# Patient Record
Sex: MALE | Race: WHITE | NOT HISPANIC OR LATINO | Employment: OTHER | URBAN - METROPOLITAN AREA
[De-identification: names, ages, dates, MRNs, and addresses within clinical notes are randomized per-mention and may not be internally consistent; named-entity substitution may affect disease eponyms.]

---

## 2017-02-22 ENCOUNTER — OFFICE VISIT (OUTPATIENT)
Dept: SURGERY | Facility: CLINIC | Age: 42
End: 2017-02-22
Payer: MEDICAID

## 2017-02-22 VITALS
OXYGEN SATURATION: 97 % | RESPIRATION RATE: 17 BRPM | SYSTOLIC BLOOD PRESSURE: 138 MMHG | HEART RATE: 89 BPM | DIASTOLIC BLOOD PRESSURE: 67 MMHG

## 2017-02-22 DIAGNOSIS — K62.89 ANAL OR RECTAL PAIN: Primary | ICD-10-CM

## 2017-02-22 DIAGNOSIS — K62.5 RECTAL BLEEDING: ICD-10-CM

## 2017-02-22 PROCEDURE — 99203 OFFICE O/P NEW LOW 30 MIN: CPT | Mod: S$GLB,,, | Performed by: SURGERY

## 2017-02-22 NOTE — PROGRESS NOTES
"History & Physical    SUBJECTIVE:     History of Present Illness:  Patient is a 41 y.o. male presents with anal bleeding and bright red blood per rectum.  Patient was seen in the emergency room approximately 2 weeks ago for similar complaints and was scheduled follow-up for here today.  Patient reports a several year history of intermittent episodes of bright red blood per rectum and perianal discomfort.  He denies any issues with constipation.  He reports regular soft bowel movements daily.  He reports a history of previous "cutting my hemorrhoids" several years ago.  He is unsure of exactly what was done.  He denies any history of previous endoscopy.  No significant weight changes.  No significant family history of colorectal cancer.    Chief Complaint   Patient presents with    Rectal Bleeding     Hurting Bad.        Review of patient's allergies indicates:  No Known Allergies    Current Outpatient Prescriptions   Medication Sig Dispense Refill    hydrocortisone-pramoxine (PROCTOFOAM-HS) rectal foam Place 1 applicator rectally 2 (two) times daily. 10 g 0    diclofenac (VOLTAREN) 75 MG EC tablet Take 1 tablet (75 mg total) by mouth 2 (two) times daily. 60 tablet 0    hydrocodone-acetaminophen 10-325mg (NORCO)  mg Tab Take 1 tablet by mouth every 4 (four) hours as needed for Pain. 12 tablet 0     No current facility-administered medications for this visit.        Past Medical History   Diagnosis Date    Depression     Status post hip surgery      right    Testicle lump      surgery     Past Surgical History   Procedure Laterality Date    Hip surgery      Right testicle surgery       Family History   Problem Relation Age of Onset    Alcohol abuse Father      Social History   Substance Use Topics    Smoking status: Current Every Day Smoker     Packs/day: 1.00     Years: 30.00     Types: Cigarettes    Smokeless tobacco: Never Used    Alcohol use 0.0 oz/week     0 Glasses of wine, 0 Shots of liquor " per week      Comment: daily drinker        Review of Systems:  Review of Systems   Constitutional: Negative for appetite change, chills, fatigue and fever.   Respiratory: Negative for cough, chest tightness, shortness of breath and wheezing.    Cardiovascular: Negative for chest pain, palpitations and leg swelling.   Gastrointestinal: Positive for anal bleeding, blood in stool and rectal pain. Negative for abdominal distention, constipation, diarrhea, nausea and vomiting.       OBJECTIVE:     Vital Signs (Most Recent)  Pulse: 89 (02/22/17 1355)  Resp: 17 (02/22/17 1355)  BP: 138/67 (02/22/17 1355)  SpO2: 97 % (02/22/17 1355)           Physical Exam:    General: Alert and oriented, No acute distress.   Eye: Extraocular movements are intact, Normal conjunctiva.  No scleral icterus  HENT: Normocephalic, Normal hearing, Oral mucosa is moist, No pharyngeal erythema.   Neck: Supple, Non-tender, No jugular venous distention.  No mass or LAD  Respiratory: Respirations are non-labored, Symmetrical chest wall expansion, No chest wall tenderness.   Cardiovascular: Normal rate, Regular rhythm, Extremities warm, No edema.   Gastrointestinal: Soft, Non-tender, Non-distended, No organomegaly.   Rectal: External exam shows no external skin tags, fissure or evidence of fistula in ano.  Digital rectal exam shows normal tone without mass or gross blood.   Integumentary: Warm, Dry.   Neurologic: Alert, Oriented, Normal motor function, No focal defects.       ASSESSMENT/PLAN:     41-year-old male with bright red blood per rectum and anal pain.  Physical examination overall unremarkable.  Patient may be having some issues with hemorrhoids however some concern patient is seeking.  Have made medical recommendations to increase fiber intake and a particular attention to adequate hydration.  Furthermore, given patient's history we will obtain laboratory data including CBC and CMP.  Will refer the patient to gastroenterology to evaluate for  endoscopy.  Patient requesting narcotics for his pain which I have declined to prescribe given his known history of substance abuse and current issues with excessive alcohol consumption.  I have strongly recommended lifestyle changes to the patient.  We'll have the patient follow-up with me after GI evaluation.

## 2017-03-16 ENCOUNTER — INITIAL CONSULT (OUTPATIENT)
Dept: SURGERY | Facility: CLINIC | Age: 42
End: 2017-03-16
Payer: MEDICAID

## 2017-03-16 VITALS
OXYGEN SATURATION: 99 % | RESPIRATION RATE: 19 BRPM | BODY MASS INDEX: 20.81 KG/M2 | DIASTOLIC BLOOD PRESSURE: 68 MMHG | SYSTOLIC BLOOD PRESSURE: 130 MMHG | HEART RATE: 98 BPM | HEIGHT: 71 IN | WEIGHT: 148.63 LBS

## 2017-03-16 DIAGNOSIS — K64.8 INTERNAL HEMORRHOIDS WITH COMPLICATION: ICD-10-CM

## 2017-03-16 DIAGNOSIS — K64.9 BLEEDING HEMORRHOID: ICD-10-CM

## 2017-03-16 DIAGNOSIS — K64.2 THIRD DEGREE HEMORRHOIDS: Primary | ICD-10-CM

## 2017-03-16 PROCEDURE — 99214 OFFICE O/P EST MOD 30 MIN: CPT | Mod: 57,S$GLB,, | Performed by: EMERGENCY MEDICINE

## 2017-03-16 RX ORDER — HYDROCODONE BITARTRATE AND ACETAMINOPHEN 10; 325 MG/1; MG/1
1 TABLET ORAL 4 TIMES DAILY PRN
Qty: 32 TABLET | Refills: 0 | Status: SHIPPED | OUTPATIENT
Start: 2017-03-16 | End: 2017-03-24

## 2017-03-16 RX ORDER — DOCUSATE SODIUM 100 MG/1
100 CAPSULE, LIQUID FILLED ORAL 2 TIMES DAILY
Qty: 40 CAPSULE | Refills: 0 | COMMUNITY
Start: 2017-03-16 | End: 2017-07-03

## 2017-03-16 NOTE — LETTER
March 16, 2017      Jm Patterson MD  [address]  [facility] [address]           [address]  [address]  [facility] [address]  Phone: [phone]  Fax: [phone]          Patient: Henok Willett   MR Number: [id number]   YOB: 1975   Date of Visit: 3/16/2017       Dear Dr. Jm Patterson:    Thank you for referring Henok Willett to me for evaluation. Attached you will find relevant portions of my assessment and plan of care.    If you have questions, please do not hesitate to call me. I look forward to following Henok Willett along with you.    Sincerely,    LUH Saha MD    Enclosure  CC:  No Recipients    If you would like to receive this communication electronically, please contact [email or web address] or [phone] to request more information on [employer] access.    For providers and/or their staff who would like to refer a patient to [facility], please contact us through our one-stop-shop provider referral line, [facility] [place], at [phone].    If you feel you have received this communication in error or would no longer like to receive these types of communications, please e-mail [email or web address]

## 2017-03-16 NOTE — PROGRESS NOTES
History & Physical    SUBJECTIVE:     History of Present Illness:  Patient is a 41 y.o. male presents with severe rectal pain and bleeding from hemorrhoids for the past few days. It is becoming worse and he had a significant bleeding episode today  Chief Complaint   Patient presents with    Hemorrhoids       Review of patient's allergies indicates:  No Known Allergies    Current Outpatient Prescriptions   Medication Sig Dispense Refill    diclofenac (VOLTAREN) 75 MG EC tablet Take 1 tablet (75 mg total) by mouth 2 (two) times daily. 60 tablet 0    hydrocortisone-pramoxine (PROCTOFOAM-HS) rectal foam Place 1 applicator rectally 2 (two) times daily. 10 applicator 0     No current facility-administered medications for this visit.        Past Medical History:   Diagnosis Date    Depression     Status post hip surgery     right    Testicle lump     surgery     Past Surgical History:   Procedure Laterality Date    HIP SURGERY      Right Testicle Surgery       Family History   Problem Relation Age of Onset    Alcohol abuse Father      Social History   Substance Use Topics    Smoking status: Current Every Day Smoker     Packs/day: 1.00     Years: 30.00     Types: Cigarettes    Smokeless tobacco: Never Used    Alcohol use 0.0 oz/week     0 Glasses of wine, 0 Shots of liquor per week      Comment: daily drinker        Review of Systems:  Review of Systems   Constitutional: Negative for appetite change, chills, diaphoresis, fatigue, fever and unexpected weight change.   HENT: Negative for congestion, dental problem, ear pain, facial swelling, mouth sores, nosebleeds, rhinorrhea, sinus pressure, sore throat, trouble swallowing and voice change.    Eyes: Negative for photophobia, redness and visual disturbance.   Respiratory: Negative for cough, choking, chest tightness, shortness of breath and wheezing.    Cardiovascular: Negative for chest pain, palpitations and leg swelling.   Gastrointestinal: Negative for  "abdominal distention, abdominal pain, anal bleeding, blood in stool, constipation, diarrhea, nausea, rectal pain and vomiting.   Endocrine: Negative for cold intolerance, heat intolerance, polydipsia, polyphagia and polyuria.   Genitourinary: Negative for difficulty urinating, discharge, dysuria, flank pain, hematuria, scrotal swelling and testicular pain.        Rectal pain and bleeding   Musculoskeletal: Negative for back pain, gait problem, joint swelling, neck pain and neck stiffness.   Skin: Negative for rash and wound.   Allergic/Immunologic: Negative for environmental allergies, food allergies and immunocompromised state.   Neurological: Negative for dizziness, seizures, syncope, facial asymmetry, speech difficulty, weakness, light-headedness, numbness and headaches.   Hematological: Negative for adenopathy. Does not bruise/bleed easily.   Psychiatric/Behavioral: Negative for confusion. The patient is not nervous/anxious.        OBJECTIVE:     Vital Signs (Most Recent)  Pulse: 98 (03/16/17 1403)  Resp: 19 (03/16/17 1403)  BP: 130/68 (03/16/17 1403)  SpO2: 99 % (03/16/17 1403)  5' 11" (1.803 m)  67.4 kg (148 lb 9.6 oz)     Physical Exam:  Physical Exam   Constitutional: He is oriented to person, place, and time. He appears well-developed and well-nourished. No distress.   HENT:   Head: Normocephalic and atraumatic.   Right Ear: External ear normal.   Left Ear: External ear normal.   Nose: Nose normal.   Mouth/Throat: Oropharynx is clear and moist.   Eyes: Conjunctivae and EOM are normal. Pupils are equal, round, and reactive to light. No scleral icterus.   Neck: No JVD present. No tracheal deviation present. No thyromegaly present.   Cardiovascular: Normal rate, regular rhythm, normal heart sounds and intact distal pulses.  Exam reveals no gallop and no friction rub.    No murmur heard.  Pulmonary/Chest: Effort normal and breath sounds normal. No stridor. No respiratory distress. He has no wheezes. He has no " rales. He exhibits no tenderness.   Abdominal: Soft. Bowel sounds are normal. He exhibits no distension and no mass. There is no tenderness. There is no rebound and no guarding. No hernia.   Genitourinary: Rectum normal, prostate normal and penis normal.   Genitourinary Comments: Internal hemorrhoids; tight sphincter; bleeding   Musculoskeletal: He exhibits no edema or tenderness.   Lymphadenopathy:     He has no cervical adenopathy.   Neurological: He is alert and oriented to person, place, and time. He has normal reflexes. He displays normal reflexes.   Skin: Skin is warm and dry. No rash noted. He is not diaphoretic. No erythema. No pallor.   Psychiatric: He has a normal mood and affect.           ASSESSMENT/PLAN:     Internal hemorrhoids with pain and bleeding    PLAN:Plan     Staple type hemorrhoidectomy in AM as an emergency add on case

## 2017-03-16 NOTE — MR AVS SNAPSHOT
[facility]  [address]  [address]  [place] [address]  Phone: [phone]  Fax: [phone]                  Henok Willett   3/16/2017 2:15 PM   Initial consult    Description:  Male : 1975   Provider:  LUH Saha MD   Department:  [facility]           Reason for Visit     Hemorrhoids           Diagnoses this Visit        Comments    Third degree hemorrhoids    -  Primary     Bleeding hemorrhoid         Internal hemorrhoids with complication                To Do List           Goals (5 Years of Data)     None       These Medications        Disp Refills Start End    hydrocodone-acetaminophen 10-325mg (NORCO)  mg Tab 32 tablet 0 3/16/2017 3/24/2017    Take 1 tablet by mouth 4 (four) times daily as needed. - Oral    Pharmacy: [facility] [place] - [address] Ph #: [phone]         PURCHASE these Medications (No prescription required)        Start End    docusate sodium (COLACE) 100 MG capsule 3/16/2017     Sig - Route: Take 1 capsule (100 mg total) by mouth 2 (two) times daily. - Oral    Class: OTC      [facility]     [facility] Nurse Care Line -  Assistance  Registered nurses in the [facility] Call Center provide clinical advisement, health education, appointment booking, and other advisory services.  Call for this free service at [phone].             Medications           Message regarding Medications     Verify the changes and/or additions to your medication regime listed below are the same as discussed with your clinician today.  If any of these changes or additions are incorrect, please notify your healthcare provider.        START taking these NEW medications        Refills    hydrocodone-acetaminophen 10-325mg (NORCO)  mg Tab 0    Sig: Take 1 tablet by mouth 4 (four) times daily as needed.    Class: Print    Route: Oral    docusate sodium (COLACE) 100 MG capsule 0    Sig: Take 1  "capsule (100 mg total) by mouth 2 (two) times daily.    Class: OTC    Route: Oral      STOP taking these medications     hydrocodone-acetaminophen 5-325mg (NORCO) 5-325 mg per tablet Take 1 tablet by mouth every 4 (four) hours as needed for Pain.           Verify that the below list of medications is an accurate representation of the medications you are currently taking.  If none reported, the list may be blank. If incorrect, please contact your healthcare provider. Carry this list with you in case of emergency.           Current Medications     diclofenac (VOLTAREN) 75 MG EC tablet Take 1 tablet (75 mg total) by mouth 2 (two) times daily.    docusate sodium (COLACE) 100 MG capsule Take 1 capsule (100 mg total) by mouth 2 (two) times daily.    hydrocodone-acetaminophen 10-325mg (NORCO)  mg Tab Take 1 tablet by mouth 4 (four) times daily as needed.    hydrocortisone-pramoxine (PROCTOFOAM-HS) rectal foam Place 1 applicator rectally 2 (two) times daily.           Clinical Reference Information           Your Vitals Were     BP Pulse Resp Height Weight SpO2    130/68 (BP Location: Right arm, Patient Position: Sitting, BP Method: Manual) 98 19 5' 11" (1.803 m) 67.4 kg (148 lb 9.6 oz) 99%    BMI                20.73 kg/m2          Blood Pressure          Most Recent Value    BP  130/68      Allergies as of 3/16/2017     No Known Allergies      Immunizations Administered on Date of Encounter - 3/16/2017     None      Orders Placed During Today's Visit      Normal Orders This Visit    Case Request Operating Room: PROCEDURE PROLAPSE HEMORRHOID (PPH)     EKG 12-lead       [facility] Sign-Up     Activating your [facility] account is as easy as 1-2-3!     1) Visit [email or web address], select Sign Up Now, enter this activation code and your date of birth, then select Next.  [email or web address]  Expires: 3/21/2017 10:42 PM      2) Create a username and password to use when you visit [facility] in the future and select a security " "question in case you lose your password and select Next.    3) Enter your e-mail address and click Sign Up!    Additional Information  If you have questions, please e-mail [email or web address] or call [phone] to talk to our [facility] staff. Remember, [facility] is NOT to be used for urgent needs. For medical emergencies, dial 911.         Instructions    1. Light breakfast day prior to procedure  2. Clear liquids remainder of day after breakfast*  3. Magnesium Citrate by mouth starting this afternoon  4. Nothing to eat or drink after midnight  5. Present to Same Day Surgery for 6:30 - 7:00 AM the day of the procedure (tomorrow)  6. Have someone available to drive you home after the procedure            * CLEAR LIQUIDS: water, tea, coffee, juices, carbonated beverages; Gatorade, broth, soups that do not have solids, smoothies, milk shakes, etc.    Instructions after Surgery    1. Purchase a Plastic Tub of Disposable Baby Wipes  2. Open the top and pour 1/2 bottle of WITCH HAZEL over the wipes and close the top ( Witch Hazel is an over-the-counter product)  3. Use the wipes to clean after bowel movements  4. You may sit in warm water several times a day or after bowel movements for comfort; however, no longer than 5 minutes at a time  5. The sutures will fall out or dissolve in due time. The "bumpy feeling" caused by the sutures will resolve in a few weeks and the skin surface will be normal again.  6. Expect some leakage of fluid, gas or bowel movement for a week or so. This will resolve as the rectal "sphincter" muscle tightens up.      You may need to wear a "panty" liner in your under wear until this resolves.  7. Take a stool softener, such as colace or surfak, two to three times a day with a small glass of water as long as you are taking pain medication to help prevent constipation  8. Call my office for any questions or problems:  [phone]       Smoking Cessation     If you would like to quit " smoking:   You may be eligible for free services if you are a [place] resident and started smoking cigarettes before September 1, 1988.  Call the Smoking Cessation Trust (SCT) toll free at (174) 054-2633 or (574) 151-6083.   Call 1-800-QUIT-NOW if you do not meet the above criteria.            Language Assistance Services     ATTENTION: Language assistance services are available, free of charge. Please call [phone].      ATENCIÓN: Si habla tatoañol, tiene a urena disposición servicios gratuitos de asistencia lingüística. Llame al [phone].     CHÚ Ý: N?u b?n nói Ti?ng Vi?t, có các d?ch v? h? tr? ngôn ng? mi?n phí dành cho b?n. G?i s? [phone].         [facility] complies with applicable Federal civil rights laws and does not discriminate on the basis of race, color, national origin, age, disability, or sex.

## 2017-03-16 NOTE — PATIENT INSTRUCTIONS
"1. Light breakfast day prior to procedure  2. Clear liquids remainder of day after breakfast*  3. Magnesium Citrate by mouth starting this afternoon  4. Nothing to eat or drink after midnight  5. Present to Same Day Surgery for 6:30 - 7:00 AM the day of the procedure (tomorrow)  6. Have someone available to drive you home after the procedure            * CLEAR LIQUIDS: water, tea, coffee, juices, carbonated beverages; Gatorade, broth, soups that do not have solids, smoothies, milk shakes, etc.    Instructions after Surgery    1. Purchase a Plastic Tub of Disposable Baby Wipes  2. Open the top and pour 1/2 bottle of WITCH HAZEL over the wipes and close the top ( Witch Hazel is an over-the-counter product)  3. Use the wipes to clean after bowel movements  4. You may sit in warm water several times a day or after bowel movements for comfort; however, no longer than 5 minutes at a time  5. The sutures will fall out or dissolve in due time. The "bumpy feeling" caused by the sutures will resolve in a few weeks and the skin surface will be normal again.  6. Expect some leakage of fluid, gas or bowel movement for a week or so. This will resolve as the rectal "sphincter" muscle tightens up.      You may need to wear a "panty" liner in your under wear until this resolves.  7. Take a stool softener, such as colace or surfak, two to three times a day with a small glass of water as long as you are taking pain medication to help prevent constipation  8. Call my office for any questions or problems:  [phone]  "

## 2017-03-17 PROBLEM — K64.9 BLEEDING HEMORRHOID: Status: ACTIVE | Noted: 2017-03-17

## 2017-03-30 ENCOUNTER — OFFICE VISIT (OUTPATIENT)
Dept: SURGERY | Facility: CLINIC | Age: 42
End: 2017-03-30
Payer: MEDICAID

## 2017-03-30 VITALS
RESPIRATION RATE: 18 BRPM | BODY MASS INDEX: 21.37 KG/M2 | WEIGHT: 152.63 LBS | SYSTOLIC BLOOD PRESSURE: 130 MMHG | HEART RATE: 74 BPM | DIASTOLIC BLOOD PRESSURE: 70 MMHG | HEIGHT: 71 IN | OXYGEN SATURATION: 99 %

## 2017-03-30 DIAGNOSIS — Z98.890 POST-OPERATIVE STATE: Primary | ICD-10-CM

## 2017-03-30 DIAGNOSIS — G89.18 PAIN AT SURGICAL SITE: ICD-10-CM

## 2017-03-30 PROCEDURE — 99024 POSTOP FOLLOW-UP VISIT: CPT | Mod: S$GLB,,, | Performed by: PHYSICIAN ASSISTANT

## 2017-03-30 RX ORDER — HYDROCODONE BITARTRATE AND ACETAMINOPHEN 10; 325 MG/1; MG/1
1 TABLET ORAL 4 TIMES DAILY PRN
Qty: 30 TABLET | Refills: 0 | Status: SHIPPED | OUTPATIENT
Start: 2017-03-30 | End: 2017-04-07

## 2017-03-30 NOTE — PATIENT INSTRUCTIONS
1.  Rx Percocet 10/325 mg one by mouth every 4 hours as needed for pain  2.   Cortisone 10 cream over the counter and apply to anus 2-3 times a day as needed for swelling to anus  3.  Call our office with any questions/concerns.  4.  Return to clinic as needed.

## 2017-03-30 NOTE — MR AVS SNAPSHOT
[facility]  [address], [address]  [facility] [address]  Phone: [phone]  Fax: [phone]                  Henok Willett   3/30/2017 1:00 PM   Office Visit    Description:  Male : 1975   Provider:  Rhea Allen PA-C   Department:  [facility]           Reason for Visit     Post-op Evaluation           Diagnoses this Visit        Comments    Post-operative state    -  Primary     Pain at surgical site                To Do List           Goals (5 Years of Data)     None       These Medications        Disp Refills Start End    hydrocodone-acetaminophen 10-325mg (NORCO)  mg Tab 30 tablet 0 3/30/2017 2017    Take 1 tablet by mouth 4 (four) times daily as needed for Pain. - Oral    Pharmacy: [place] [address] Ph #: [phone]         [facility]     [facility] On Call Nurse Care Line -  Assistance  Unless otherwise directed by your provider, please contact [facility], our nurse care line that is available for  assistance.     Registered nurses in the [facility] On Call Center provide: appointment scheduling, clinical advisement, health education, and other advisory services.  Call: [phone] (toll free)               Medications           Message regarding Medications     Verify the changes and/or additions to your medication regime listed below are the same as discussed with your clinician today.  If any of these changes or additions are incorrect, please notify your healthcare provider.        START taking these NEW medications        Refills    hydrocodone-acetaminophen 10-325mg (NORCO)  mg Tab 0    Sig: Take 1 tablet by mouth 4 (four) times daily as needed for Pain.    Class: Print    Route: Oral           Verify that the below list of medications is an accurate representation of the medications you are currently taking.  If none reported, the list may be blank. If  "incorrect, please contact your healthcare provider. Carry this list with you in case of emergency.           Current Medications     docusate sodium (COLACE) 100 MG capsule Take 1 capsule (100 mg total) by mouth 2 (two) times daily.    hydrocodone-acetaminophen 10-325mg (NORCO)  mg Tab Take 1 tablet by mouth 4 (four) times daily as needed for Pain.    tamsulosin (FLOMAX) 0.4 mg Cp24 Take 1 capsule (0.4 mg total) by mouth once daily.           Clinical Reference Information           Your Vitals Were     BP Pulse Resp Height Weight SpO2    130/70 (BP Location: Right arm, Patient Position: Sitting, BP Method: Manual) 74 18 5' 11" (1.803 m) 69.2 kg (152 lb 9.6 oz) 99%    BMI                21.28 kg/m2          Blood Pressure          Most Recent Value    BP  130/70      Allergies as of 3/30/2017     Wasp Sting [Allergen Ext-venom-honey Bee]      Immunizations Administered on Date of Encounter - 3/30/2017     None      [facility] Sign-Up     Activating your [facility] account is as easy as 1-2-3!     1) Visit [email or web address], select Sign Up Now, enter this activation code and your date of birth, then select Next.  [email or web address]  Expires: 5/14/2017  2:01 PM      2) Create a username and password to use when you visit [facility] in the future and select a security question in case you lose your password and select Next.    3) Enter your e-mail address and click Sign Up!    Additional Information  If you have questions, please e-mail [email or web address] or call [phone] to talk to our [facility] staff. Remember, [facility] is NOT to be used for urgent needs. For medical emergencies, dial 911.         Instructions    1.  Rx Percocet 10/325 mg one by mouth every 4 hours as needed for pain  2.   Cortisone 10 cream over the counter and apply to anus 2-3 times a day as needed for swelling to anus  3.  Call our office with any questions/concerns.  4.  Return to clinic as needed.       Smoking Cessation     If " you would like to quit smoking:   You may be eligible for free services if you are a [place] resident and started smoking cigarettes before September 1, 1988.  Call the Smoking Cessation Trust (SCT) toll free at (242) 464-9909 or (548) 703-7638.   Call 1-800-QUIT-NOW if you do not meet the above criteria.   Contact us via email: [email or web address]   View our website for more information: [email or web address]        Language Assistance Services     ATTENTION: Language assistance services are available, free of charge. Please call [phone].      ATENCIÓN: Si habla español, tiene a urena disposición servicios gratuitos de asistencia lingüística. Llame al [phone].     CHÚ Ý: N?u b?n nói Ti?ng Vi?t, có các d?ch v? h? tr? ngôn ng? mi?n phí dành cho b?n. G?i s? [phone].         [facility] complies with applicable Federal civil rights laws and does not discriminate on the basis of race, color, national origin, age, disability, or sex.

## 2017-03-30 NOTE — PROGRESS NOTES
"History & Physical    SUBJECTIVE:     History of Present Illness:  Patient is a 41 y.o. male presents for post operative evaluation.  Patient under went Staple type Hemorrhoidectomy on 3/17/2017.  Patient c/o pain or pulling @ the rectum/anus.  Patient has no other complaints.    Chief Complaint   Patient presents with    Post-op Evaluation       Review of patient's allergies indicates:   Allergen Reactions    Wasp sting [allergen ext-venom-honey bee] Swelling       Current Outpatient Prescriptions   Medication Sig Dispense Refill    docusate sodium (COLACE) 100 MG capsule Take 1 capsule (100 mg total) by mouth 2 (two) times daily. 40 capsule 0    tamsulosin (FLOMAX) 0.4 mg Cp24 Take 1 capsule (0.4 mg total) by mouth once daily. 20 capsule 11     No current facility-administered medications for this visit.        Past Medical History:   Diagnosis Date    Depression     Status post hip surgery     right    Testicle lump     surgery     Past Surgical History:   Procedure Laterality Date    HIP SURGERY      Right Testicle Surgery       Family History   Problem Relation Age of Onset    Alcohol abuse Father      Social History   Substance Use Topics    Smoking status: Current Every Day Smoker     Packs/day: 1.00     Years: 30.00     Types: Cigarettes    Smokeless tobacco: Never Used    Alcohol use 0.0 oz/week     0 Glasses of wine, 0 Shots of liquor per week      Comment: daily drinker        Review of Systems:  Review of Systems   Skin:        S/P Staple type hemorrhoidectomy 3/17/2017  + pain/pulling at the rectum/anus       OBJECTIVE:     Vital Signs (Most Recent)  Pulse: 74 (03/30/17 1323)  Resp: 18 (03/30/17 1323)  BP: 130/70 (03/30/17 1323)  SpO2: 99 % (03/30/17 1323)  5' 11" (1.803 m)  69.2 kg (152 lb 9.6 oz)     Physical Exam:  Physical Exam   Constitutional: He appears well-developed and well-nourished. No distress.   Genitourinary:   Genitourinary Comments: S/P Staple type hemorrhoidectomy " 3/17/2017.  Decreased edema to anus.  No bleeding noted.  + Spasm of rectal muscle.   Skin: He is not diaphoretic.   Nursing note and vitals reviewed.      Laboratory      Diagnostic Results:    Pathology Results:  Squamous and glandular mucosa with underlying hemorrhoids    ASSESSMENT/PLAN:     1.  S/P Staple type Hemorrhoidectomy 3/17/2017  2.  Pain @ surgical site  3.  Spasm of rectal muscle    PLAN:Plan     1.  Rx Norco 10/325 mg one po QID prn pain  2.  Apply Cortisone 10 Cream to anus BID to TID prn  3.  RTC prn  4.  Call our office with any questions/concerns.  5.  Dr Saha advised of above.

## 2017-06-20 ENCOUNTER — TELEPHONE (OUTPATIENT)
Dept: GASTROENTEROLOGY | Facility: CLINIC | Age: 42
End: 2017-06-20

## 2017-06-20 NOTE — TELEPHONE ENCOUNTER
----- Message from Destiney Seay sent at 6/20/2017 10:50 AM CDT -----  Pt needs Colonoscopy, scanned in EPIC

## 2017-07-03 ENCOUNTER — TELEPHONE (OUTPATIENT)
Dept: GASTROENTEROLOGY | Facility: CLINIC | Age: 42
End: 2017-07-03

## 2017-07-03 DIAGNOSIS — Z12.11 SPECIAL SCREENING FOR MALIGNANT NEOPLASMS, COLON: Primary | ICD-10-CM

## 2017-07-03 NOTE — TELEPHONE ENCOUNTER
Reason for Colonoscopy Referral: no  Any GI Symptoms: no  Last Colonoscopy: no  History of Colon Polyps: no  Family History of colon cancer or colon polyps (under 50- history of first degree relative w/colon cancer, what family member-age of onset: no  Iron Deficiency/Anemia: no  Meds reviewed and updated: yes  Anti-inflammatory meds/NSAIDS: no  Allergies updated: no  6 month surgical history: hemorrhoids   Blood Thinners: none  Lung disease: no  Heart disease: no  Valve replacement: no  Kidney disease: no  SCHEDULED: 7/12/17    Patient scheduled at [facility] for screening colonoscopy On 7/12/17, prep packet was given and explained, patient verbalized understanding. Patient aware that surgery will call them with arrival time prior to scope.      Krsitie called into [facility]

## 2018-06-11 DIAGNOSIS — R55 SYNCOPE AND COLLAPSE: Primary | ICD-10-CM

## 2018-06-20 ENCOUNTER — INITIAL CONSULT (OUTPATIENT)
Dept: GASTROENTEROLOGY | Facility: CLINIC | Age: 43
End: 2018-06-20
Payer: MEDICAID

## 2018-06-20 VITALS
BODY MASS INDEX: 20.58 KG/M2 | RESPIRATION RATE: 18 BRPM | WEIGHT: 147 LBS | DIASTOLIC BLOOD PRESSURE: 66 MMHG | HEART RATE: 104 BPM | SYSTOLIC BLOOD PRESSURE: 106 MMHG | HEIGHT: 71 IN

## 2018-06-20 DIAGNOSIS — R19.7 DIARRHEA, UNSPECIFIED TYPE: Primary | ICD-10-CM

## 2018-06-20 PROCEDURE — 99203 OFFICE O/P NEW LOW 30 MIN: CPT | Mod: S$GLB,,, | Performed by: NURSE PRACTITIONER

## 2018-06-20 RX ORDER — CYCLOBENZAPRINE HCL 10 MG
TABLET ORAL
Refills: 3 | COMMUNITY
Start: 2018-06-05

## 2018-06-20 RX ORDER — SULFAMETHOXAZOLE AND TRIMETHOPRIM 800; 160 MG/1; MG/1
TABLET ORAL
COMMUNITY
End: 2018-10-01

## 2018-06-20 RX ORDER — DICLOFENAC SODIUM 75 MG/1
TABLET, DELAYED RELEASE ORAL
COMMUNITY
End: 2018-10-01

## 2018-06-20 NOTE — PROGRESS NOTES
Subjective:       Patient ID: Henok Willett is a 43 y.o. male.    Chief Complaint: Diarrhea    HPIReports1.5 weeks of diarrhea.  With 3-6 watery stools daily. No blood with bowel movements or black stools.  He reports prior he would have alternating loose stools with fecal urgency and regular bowel movements.   Reports that the urgency began after hemorrhoidectomy last year.    Denies abdominal pain.  Reports vomiting this morning.  Denies heartburn or acid reflux.    He had recent ED visit after syncopal episode and injury to the chest with fall.  He continues to have musculoskeletal chest discomfort.    He was given bactrim at that time.        Review of Systems   Constitutional: Positive for activity change. Negative for appetite change.   Respiratory: Positive for chest tightness and shortness of breath.    Gastrointestinal: Positive for diarrhea and vomiting. Negative for abdominal pain, anal bleeding, blood in stool, constipation and rectal pain.   Musculoskeletal: Positive for arthralgias.   Neurological: Positive for syncope.   Psychiatric/Behavioral: Negative.        Objective:      Physical Exam   Constitutional: He is oriented to person, place, and time. He appears well-developed and well-nourished. No distress.   Eyes: No scleral icterus.   Cardiovascular: Normal rate.    Pulmonary/Chest: Effort normal. No respiratory distress.   Abdominal: Soft.   Neurological: He is alert and oriented to person, place, and time.   Skin: Skin is warm and dry. He is not diaphoretic.   Psychiatric: He has a normal mood and affect. His behavior is normal. Judgment and thought content normal.   Vitals reviewed.      Assessment:       1. Diarrhea, unspecified type        Plan:     Henok was seen today for diarrhea.    Diagnoses and all orders for this visit:    Diarrhea, unspecified type  -     CBC auto differential; Future  -     Sedimentation rate; Future  -     C-reactive protein; Future  -     Comprehensive metabolic  panel; Future  -     Lipase; Future  -     Clostridium difficile EIA; Future  -     Stool culture; Future  -     Stool Exam-Ova,Cysts,Parasites; Future  -     Giardia / Cryptosporidum, EIA; Future        If labs and stool studies are negative , will consider a trial of xifaxan followed by probiotic.  Could consider colonoscopy if complaints still persist.

## 2018-06-20 NOTE — LETTER
June 20, 2018      Gerry Eng Jr., MD  [address] [place] [address]           [facility]  [address] , [address]   [place] [address]  Phone: [phone]  Fax: [phone]          Patient: Henok Willett   MR Number: [id number]   YOB: 1975   Date of Visit: 6/20/2018       Dear Dr. Gerry Eng Jr.:    Thank you for referring Henok Willett to me for evaluation. Attached you will find relevant portions of my assessment and plan of care.    If you have questions, please do not hesitate to call me. I look forward to following Henok Willett along with you.    Sincerely,    Mary Pereira, [facility]    Enclosure  CC:  No Recipients    If you would like to receive this communication electronically, please contact [email or web address] or [phone] to request more information on [employer] Link access.    For providers and/or their staff who would like to refer a patient to [facility], please contact us through our one-stop-shop provider referral line, [facility], at [phone].    If you feel you have received this communication in error or would no longer like to receive these types of communications, please e-mail [email or web address]

## 2018-06-22 ENCOUNTER — TELEPHONE (OUTPATIENT)
Dept: GASTROENTEROLOGY | Facility: CLINIC | Age: 43
End: 2018-06-22

## 2018-06-22 NOTE — TELEPHONE ENCOUNTER
----- Message from DELMAR Enciso sent at 6/21/2018 10:04 AM CDT -----  Let him know one inflammatory marker is elevated, one is normal and the rest of the labs look ok.  Needs to collect stool and then can make further recommendations

## 2018-06-22 NOTE — TELEPHONE ENCOUNTER
Informed pt friend Qinglopez of lab results. She said that she is his caretaker and she will help with colleting the stool. Verbal Understanding

## 2018-10-01 ENCOUNTER — HOSPITAL ENCOUNTER (OUTPATIENT)
Facility: HOSPITAL | Age: 43
Discharge: HOME | End: 2018-10-02
Attending: EMERGENCY MEDICINE | Admitting: HOSPITALIST
Payer: MEDICAID

## 2018-10-01 DIAGNOSIS — L03.90 CELLULITIS, UNSPECIFIED CELLULITIS SITE: ICD-10-CM

## 2018-10-01 DIAGNOSIS — L02.512 ABSCESS OF FINGER OF LEFT HAND: Primary | ICD-10-CM
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
## 2018-10-01 LAB
CRP SERPL-MCNC: 3.9 MG/L
LACTATE SERPL-SCNC: 1.1 MMOL/L

## 2018-10-01 PROCEDURE — G0378 HOSPITAL OBSERVATION PER HR: HCPCS

## 2018-10-01 PROCEDURE — 87077 CULTURE AEROBIC IDENTIFY: CPT | Mod: 59

## 2018-10-01 PROCEDURE — 96375 TX/PRO/DX INJ NEW DRUG ADDON: CPT

## 2018-10-01 PROCEDURE — 99285 EMERGENCY DEPT VISIT HI MDM: CPT | Mod: 25

## 2018-10-01 PROCEDURE — 87070 CULTURE OTHR SPECIMN AEROBIC: CPT

## 2018-10-01 PROCEDURE — 87075 CULTR BACTERIA EXCEPT BLOOD: CPT

## 2018-10-01 PROCEDURE — 84134 ASSAY OF PREALBUMIN: CPT

## 2018-10-01 PROCEDURE — 99284 EMERGENCY DEPT VISIT MOD MDM: CPT | Mod: ,,, | Performed by: PHYSICIAN ASSISTANT

## 2018-10-01 PROCEDURE — 63600175 PHARM REV CODE 636 W HCPCS: Performed by: STUDENT IN AN ORGANIZED HEALTH CARE EDUCATION/TRAINING PROGRAM

## 2018-10-01 PROCEDURE — 87186 SC STD MICRODIL/AGAR DIL: CPT

## 2018-10-01 PROCEDURE — 83605 ASSAY OF LACTIC ACID: CPT

## 2018-10-01 PROCEDURE — 25000003 PHARM REV CODE 250: Performed by: STUDENT IN AN ORGANIZED HEALTH CARE EDUCATION/TRAINING PROGRAM

## 2018-10-01 PROCEDURE — 99220 PR INITIAL OBSERVATION CARE,LEVL III: CPT | Mod: ,,, | Performed by: PHYSICIAN ASSISTANT

## 2018-10-01 PROCEDURE — 96374 THER/PROPH/DIAG INJ IV PUSH: CPT

## 2018-10-01 PROCEDURE — 86140 C-REACTIVE PROTEIN: CPT

## 2018-10-01 PROCEDURE — 85652 RBC SED RATE AUTOMATED: CPT

## 2018-10-01 PROCEDURE — 87076 CULTURE ANAEROBE IDENT EACH: CPT

## 2018-10-01 PROCEDURE — 10060 I&D ABSCESS SIMPLE/SINGLE: CPT

## 2018-10-01 RX ORDER — LIDOCAINE HYDROCHLORIDE 10 MG/ML
20 INJECTION INFILTRATION; PERINEURAL ONCE
Status: COMPLETED | OUTPATIENT
Start: 2018-10-01 | End: 2018-10-01

## 2018-10-01 RX ORDER — CIPROFLOXACIN 500 MG/1
500 TABLET ORAL EVERY 12 HOURS
Status: DISCONTINUED | OUTPATIENT
Start: 2018-10-02 | End: 2018-10-02

## 2018-10-01 RX ORDER — HYDROMORPHONE HYDROCHLORIDE 1 MG/ML
1 INJECTION, SOLUTION INTRAMUSCULAR; INTRAVENOUS; SUBCUTANEOUS ONCE
Status: COMPLETED | OUTPATIENT
Start: 2018-10-01 | End: 2018-10-01

## 2018-10-01 RX ORDER — DOXYCYCLINE HYCLATE 100 MG
100 TABLET ORAL
Status: DISCONTINUED | OUTPATIENT
Start: 2018-10-01 | End: 2018-10-01

## 2018-10-01 RX ORDER — MORPHINE SULFATE 2 MG/ML
6 INJECTION, SOLUTION INTRAMUSCULAR; INTRAVENOUS
Status: DISCONTINUED | OUTPATIENT
Start: 2018-10-01 | End: 2018-10-01

## 2018-10-01 RX ADMIN — HYDROMORPHONE HYDROCHLORIDE 1 MG: 1 INJECTION, SOLUTION INTRAMUSCULAR; INTRAVENOUS; SUBCUTANEOUS at 10:10

## 2018-10-01 RX ADMIN — LIDOCAINE HYDROCHLORIDE 20 ML: 10 INJECTION, SOLUTION INFILTRATION; PERINEURAL at 10:10

## 2018-10-02 ENCOUNTER — TELEPHONE (OUTPATIENT)
Dept: ORTHOPEDICS | Facility: CLINIC | Age: 43
End: 2018-10-02

## 2018-10-02 VITALS
RESPIRATION RATE: 18 BRPM | WEIGHT: 145.94 LBS | HEART RATE: 82 BPM | OXYGEN SATURATION: 99 % | HEIGHT: 69 IN | BODY MASS INDEX: 21.62 KG/M2 | TEMPERATURE: 97 F | SYSTOLIC BLOOD PRESSURE: 139 MMHG | DIASTOLIC BLOOD PRESSURE: 87 MMHG

## 2018-10-02 PROBLEM — Z72.0 TOBACCO ABUSE: Status: ACTIVE | Noted: 2018-10-02

## 2018-10-02 PROBLEM — F10.10 ETOH ABUSE: Status: ACTIVE | Noted: 2018-10-02

## 2018-10-02 PROBLEM — L02.512 ABSCESS OF FINGER OF LEFT HAND: Status: ACTIVE | Noted: 2018-10-02

## 2018-10-02 LAB
ALBUMIN SERPL BCP-MCNC: 3.9 G/DL
ALP SERPL-CCNC: 83 U/L
ALT SERPL W/O P-5'-P-CCNC: 12 U/L
ANION GAP SERPL CALC-SCNC: 11 MMOL/L
AST SERPL-CCNC: 21 U/L
BASOPHILS # BLD AUTO: 0.03 K/UL
BASOPHILS NFR BLD: 0.4 %
BILIRUB DIRECT SERPL-MCNC: 0.4 MG/DL
BILIRUB SERPL-MCNC: 0.9 MG/DL
BILIRUB UR QL STRIP: NEGATIVE
BUN SERPL-MCNC: 7 MG/DL
CALCIUM SERPL-MCNC: 9.4 MG/DL
CHLORIDE SERPL-SCNC: 100 MMOL/L
CLARITY UR REFRACT.AUTO: CLEAR
CO2 SERPL-SCNC: 27 MMOL/L
COLOR UR AUTO: YELLOW
CREAT SERPL-MCNC: 0.9 MG/DL
DIFFERENTIAL METHOD: ABNORMAL
EOSINOPHIL # BLD AUTO: 0.1 K/UL
EOSINOPHIL NFR BLD: 1 %
ERYTHROCYTE [DISTWIDTH] IN BLOOD BY AUTOMATED COUNT: 14 %
ERYTHROCYTE [SEDIMENTATION RATE] IN BLOOD BY WESTERGREN METHOD: 2 MM/HR
EST. GFR  (AFRICAN AMERICAN): >60 ML/MIN/1.73 M^2
EST. GFR  (NON AFRICAN AMERICAN): >60 ML/MIN/1.73 M^2
FOLATE SERPL-MCNC: 26.3 NG/ML
GLUCOSE SERPL-MCNC: 91 MG/DL
GLUCOSE UR QL STRIP: NEGATIVE
HCT VFR BLD AUTO: 45.8 %
HGB BLD-MCNC: 14.7 G/DL
HGB UR QL STRIP: NEGATIVE
IMM GRANULOCYTES # BLD AUTO: 0.03 K/UL
IMM GRANULOCYTES NFR BLD AUTO: 0.4 %
KETONES UR QL STRIP: NEGATIVE
LEUKOCYTE ESTERASE UR QL STRIP: NEGATIVE
LYMPHOCYTES # BLD AUTO: 1.1 K/UL
LYMPHOCYTES NFR BLD: 15.9 %
MAGNESIUM SERPL-MCNC: 2.1 MG/DL
MCH RBC QN AUTO: 32.2 PG
MCHC RBC AUTO-ENTMCNC: 32.1 G/DL
MCV RBC AUTO: 100 FL
MONOCYTES # BLD AUTO: 0.7 K/UL
MONOCYTES NFR BLD: 9.9 %
NEUTROPHILS # BLD AUTO: 5 K/UL
NEUTROPHILS NFR BLD: 72.4 %
NITRITE UR QL STRIP: NEGATIVE
NRBC BLD-RTO: 0 /100 WBC
PH UR STRIP: 6 [PH] (ref 5–8)
PHOSPHATE SERPL-MCNC: 3.2 MG/DL
PLATELET # BLD AUTO: 256 K/UL
PMV BLD AUTO: 9.6 FL
POTASSIUM SERPL-SCNC: 4.2 MMOL/L
PREALB SERPL-MCNC: 30 MG/DL
PROT SERPL-MCNC: 7.5 G/DL
PROT UR QL STRIP: NEGATIVE
RBC # BLD AUTO: 4.56 M/UL
SODIUM SERPL-SCNC: 138 MMOL/L
SP GR UR STRIP: 1.02 (ref 1–1.03)
URN SPEC COLLECT METH UR: NORMAL
UROBILINOGEN UR STRIP-ACNC: NEGATIVE EU/DL
WBC # BLD AUTO: 6.96 K/UL

## 2018-10-02 PROCEDURE — 25000003 PHARM REV CODE 250: Performed by: PHYSICIAN ASSISTANT

## 2018-10-02 PROCEDURE — 99214 OFFICE O/P EST MOD 30 MIN: CPT | Mod: ,,, | Performed by: INTERNAL MEDICINE

## 2018-10-02 PROCEDURE — 81003 URINALYSIS AUTO W/O SCOPE: CPT

## 2018-10-02 PROCEDURE — G0378 HOSPITAL OBSERVATION PER HR: HCPCS

## 2018-10-02 PROCEDURE — 83735 ASSAY OF MAGNESIUM: CPT

## 2018-10-02 PROCEDURE — 82746 ASSAY OF FOLIC ACID SERUM: CPT

## 2018-10-02 PROCEDURE — 87206 SMEAR FLUORESCENT/ACID STAI: CPT

## 2018-10-02 PROCEDURE — 99204 OFFICE O/P NEW MOD 45 MIN: CPT | Mod: 57,,, | Performed by: ORTHOPAEDIC SURGERY

## 2018-10-02 PROCEDURE — 99226 PR SUBSEQUENT OBSERVATION CARE,LEVEL III: CPT | Mod: ,,, | Performed by: PHYSICIAN ASSISTANT

## 2018-10-02 PROCEDURE — 85025 COMPLETE CBC W/AUTO DIFF WBC: CPT

## 2018-10-02 PROCEDURE — 63600175 PHARM REV CODE 636 W HCPCS: Performed by: PHYSICIAN ASSISTANT

## 2018-10-02 PROCEDURE — S4991 NICOTINE PATCH NONLEGEND: HCPCS | Performed by: PHYSICIAN ASSISTANT

## 2018-10-02 PROCEDURE — 80076 HEPATIC FUNCTION PANEL: CPT

## 2018-10-02 PROCEDURE — 87116 MYCOBACTERIA CULTURE: CPT

## 2018-10-02 PROCEDURE — 84425 ASSAY OF VITAMIN B-1: CPT

## 2018-10-02 PROCEDURE — 84100 ASSAY OF PHOSPHORUS: CPT

## 2018-10-02 PROCEDURE — 80048 BASIC METABOLIC PNL TOTAL CA: CPT

## 2018-10-02 PROCEDURE — 36415 COLL VENOUS BLD VENIPUNCTURE: CPT

## 2018-10-02 RX ORDER — AMOXICILLIN 250 MG
1 CAPSULE ORAL 2 TIMES DAILY PRN
Status: DISCONTINUED | OUTPATIENT
Start: 2018-10-02 | End: 2018-10-02 | Stop reason: HOSPADM

## 2018-10-02 RX ORDER — THIAMINE HCL 100 MG
100 TABLET ORAL DAILY
Status: DISCONTINUED | OUTPATIENT
Start: 2018-10-02 | End: 2018-10-02 | Stop reason: HOSPADM

## 2018-10-02 RX ORDER — RAMELTEON 8 MG/1
8 TABLET ORAL NIGHTLY PRN
Status: DISCONTINUED | OUTPATIENT
Start: 2018-10-02 | End: 2018-10-02 | Stop reason: HOSPADM

## 2018-10-02 RX ORDER — DOXYCYCLINE HYCLATE 100 MG
100 TABLET ORAL EVERY 12 HOURS
Status: DISCONTINUED | OUTPATIENT
Start: 2018-10-02 | End: 2018-10-02 | Stop reason: HOSPADM

## 2018-10-02 RX ORDER — SODIUM CHLORIDE 0.9 % (FLUSH) 0.9 %
5 SYRINGE (ML) INJECTION
Status: DISCONTINUED | OUTPATIENT
Start: 2018-10-02 | End: 2018-10-02 | Stop reason: HOSPADM

## 2018-10-02 RX ORDER — SODIUM CHLORIDE 9 MG/ML
INJECTION, SOLUTION INTRAVENOUS CONTINUOUS
Status: DISCONTINUED | OUTPATIENT
Start: 2018-10-02 | End: 2018-10-02

## 2018-10-02 RX ORDER — IBUPROFEN 200 MG
16 TABLET ORAL
Status: DISCONTINUED | OUTPATIENT
Start: 2018-10-02 | End: 2018-10-02 | Stop reason: HOSPADM

## 2018-10-02 RX ORDER — OXYCODONE HYDROCHLORIDE 5 MG/1
5 TABLET ORAL EVERY 6 HOURS PRN
Status: DISCONTINUED | OUTPATIENT
Start: 2018-10-02 | End: 2018-10-02 | Stop reason: HOSPADM

## 2018-10-02 RX ORDER — IBUPROFEN 200 MG
24 TABLET ORAL
Status: DISCONTINUED | OUTPATIENT
Start: 2018-10-02 | End: 2018-10-02 | Stop reason: HOSPADM

## 2018-10-02 RX ORDER — GLUCAGON 1 MG
1 KIT INJECTION
Status: DISCONTINUED | OUTPATIENT
Start: 2018-10-02 | End: 2018-10-02 | Stop reason: HOSPADM

## 2018-10-02 RX ORDER — FOLIC ACID 1 MG/1
1 TABLET ORAL DAILY
Status: DISCONTINUED | OUTPATIENT
Start: 2018-10-02 | End: 2018-10-02 | Stop reason: HOSPADM

## 2018-10-02 RX ORDER — DOXYCYCLINE HYCLATE 100 MG
100 TABLET ORAL EVERY 12 HOURS
Qty: 28 TABLET | Refills: 0 | Status: SHIPPED | OUTPATIENT
Start: 2018-10-02 | End: 2018-10-16

## 2018-10-02 RX ORDER — PROMETHAZINE HYDROCHLORIDE 25 MG/1
25 TABLET ORAL EVERY 6 HOURS PRN
Status: DISCONTINUED | OUTPATIENT
Start: 2018-10-02 | End: 2018-10-02 | Stop reason: HOSPADM

## 2018-10-02 RX ORDER — ONDANSETRON 4 MG/1
4 TABLET, ORALLY DISINTEGRATING ORAL EVERY 8 HOURS PRN
Status: DISCONTINUED | OUTPATIENT
Start: 2018-10-02 | End: 2018-10-02 | Stop reason: HOSPADM

## 2018-10-02 RX ORDER — CIPROFLOXACIN 750 MG/1
750 TABLET, FILM COATED ORAL EVERY 12 HOURS
Qty: 28 TABLET | Refills: 0 | Status: SHIPPED | OUTPATIENT
Start: 2018-10-02 | End: 2018-10-16

## 2018-10-02 RX ORDER — CIPROFLOXACIN 750 MG/1
750 TABLET, FILM COATED ORAL EVERY 12 HOURS
Status: DISCONTINUED | OUTPATIENT
Start: 2018-10-02 | End: 2018-10-02 | Stop reason: HOSPADM

## 2018-10-02 RX ORDER — KETOROLAC TROMETHAMINE 30 MG/ML
15 INJECTION, SOLUTION INTRAMUSCULAR; INTRAVENOUS EVERY 6 HOURS PRN
Status: DISCONTINUED | OUTPATIENT
Start: 2018-10-02 | End: 2018-10-02 | Stop reason: HOSPADM

## 2018-10-02 RX ORDER — OXYCODONE HYDROCHLORIDE 5 MG/1
5 TABLET ORAL EVERY 6 HOURS PRN
Qty: 15 TABLET | Refills: 0 | Status: SHIPPED | OUTPATIENT
Start: 2018-10-02

## 2018-10-02 RX ORDER — NICOTINE 7MG/24HR
1 PATCH, TRANSDERMAL 24 HOURS TRANSDERMAL DAILY
Status: DISCONTINUED | OUTPATIENT
Start: 2018-10-02 | End: 2018-10-02 | Stop reason: HOSPADM

## 2018-10-02 RX ORDER — OXYCODONE AND ACETAMINOPHEN 10; 325 MG/1; MG/1
1 TABLET ORAL ONCE AS NEEDED
Status: COMPLETED | OUTPATIENT
Start: 2018-10-02 | End: 2018-10-02

## 2018-10-02 RX ORDER — IPRATROPIUM BROMIDE AND ALBUTEROL SULFATE 2.5; .5 MG/3ML; MG/3ML
3 SOLUTION RESPIRATORY (INHALATION) EVERY 4 HOURS PRN
Status: DISCONTINUED | OUTPATIENT
Start: 2018-10-02 | End: 2018-10-02 | Stop reason: HOSPADM

## 2018-10-02 RX ORDER — ACETAMINOPHEN 325 MG/1
650 TABLET ORAL EVERY 4 HOURS PRN
Status: DISCONTINUED | OUTPATIENT
Start: 2018-10-02 | End: 2018-10-02 | Stop reason: HOSPADM

## 2018-10-02 RX ADMIN — OXYCODONE HYDROCHLORIDE AND ACETAMINOPHEN 1 TABLET: 10; 325 TABLET ORAL at 01:10

## 2018-10-02 RX ADMIN — THERA TABS 1 TABLET: TAB at 03:10

## 2018-10-02 RX ADMIN — RAMELTEON 8 MG: 8 TABLET, FILM COATED ORAL at 01:10

## 2018-10-02 RX ADMIN — OXYCODONE HYDROCHLORIDE 5 MG: 5 TABLET ORAL at 07:10

## 2018-10-02 RX ADMIN — CIPROFLOXACIN HYDROCHLORIDE 500 MG: 500 TABLET, FILM COATED ORAL at 12:10

## 2018-10-02 RX ADMIN — FOLIC ACID 1 MG: 1 TABLET ORAL at 03:10

## 2018-10-02 RX ADMIN — Medication 100 MG: at 03:10

## 2018-10-02 RX ADMIN — CIPROFLOXACIN HYDROCHLORIDE 500 MG: 500 TABLET, FILM COATED ORAL at 08:10

## 2018-10-02 RX ADMIN — OXYCODONE HYDROCHLORIDE 5 MG: 5 TABLET ORAL at 12:10

## 2018-10-02 RX ADMIN — DOXYCYCLINE 100 MG: 100 INJECTION, POWDER, LYOPHILIZED, FOR SOLUTION INTRAVENOUS at 12:10

## 2018-10-02 RX ADMIN — PROMETHAZINE HYDROCHLORIDE 25 MG: 25 TABLET ORAL at 01:10

## 2018-10-02 RX ADMIN — DOXYCYCLINE 100 MG: 100 INJECTION, POWDER, LYOPHILIZED, FOR SOLUTION INTRAVENOUS at 01:10

## 2018-10-02 RX ADMIN — KETOROLAC TROMETHAMINE 15 MG: 30 INJECTION, SOLUTION INTRAMUSCULAR; INTRAVENOUS at 08:10

## 2018-10-02 RX ADMIN — NICOTINE 1 PATCH: 7 PATCH, EXTENDED RELEASE TRANSDERMAL at 08:10

## 2018-10-02 RX ADMIN — SODIUM CHLORIDE: 0.9 INJECTION, SOLUTION INTRAVENOUS at 02:10

## 2018-10-02 NOTE — PROGRESS NOTES
"[facility]  Orthopedics  Progress Note    Patient Name: Henok Willett  MRN: [id number]  Admission Date: 10/1/2018  Hospital Length of Stay: 0 days  Attending Provider: Jose Bowers MD  Primary Care Provider: Kapil Eng Jr, MD    Subjective:     Principal Problem:Abscess of finger of left hand    Principal Orthopedic Problem: same    Interval History: Patient seen and examined at bedside. Day 0 s/p I and D left thumb abscess.   No acute events overnight.  Pain controlled.  Resolving pain and swelling.      Review of patient's allergies indicates:   Allergen Reactions    Wasp sting [allergen ext-venom-honey bee] Swelling       Current Facility-Administered Medications   Medication    0.9%  NaCl infusion    acetaminophen tablet 650 mg    albuterol-ipratropium 2.5 mg-0.5 mg/3 mL nebulizer solution 3 mL    ciprofloxacin HCl tablet 500 mg    dextrose 50% injection 12.5 g    dextrose 50% injection 25 g    doxycycline (VIBRAMYCIN) 100 mg in dextrose 5 % 250 mL IVPB    folic acid tablet 1 mg    glucagon (human recombinant) injection 1 mg    glucose chewable tablet 16 g    glucose chewable tablet 24 g    ketorolac injection 15 mg    multivitamin tablet 1 tablet    nicotine 7 mg/24 hr 1 patch    ondansetron disintegrating tablet 4 mg    oxyCODONE immediate release tablet 5 mg    promethazine tablet 25 mg    ramelteon tablet 8 mg    senna-docusate 8.6-50 mg per tablet 1 tablet    sodium chloride 0.9% flush 5 mL    thiamine tablet 100 mg     Objective:     Vital Signs (Most Recent):  Temp: 97.7 °F (36.5 °C) (10/02/18 0455)  Pulse: (!) 45 (10/02/18 0455)  Resp: 18 (10/02/18 0455)  BP: 128/76 (10/02/18 0455)  SpO2: 98 % (10/02/18 0455) Vital Signs (24h Range):  Temp:  [97.7 °F (36.5 °C)-98.2 °F (36.8 °C)] 97.7 °F (36.5 °C)  Pulse:  [45-92] 45  Resp:  [16-18] 18  SpO2:  [97 %-100 %] 98 %  BP: (110-136)/(67-83) 128/76     Weight: 66.2 kg (145 lb 15.1 oz)  Height: 5' 9" (175.3 " cm)  Body mass index is 21.55 kg/m².    No intake or output data in the 24 hours ending 10/02/18 0520    Ortho/SPM Exam  Physical Exam:  NAD, A/O x 3.  Wound c/d/i with clean dressing.  No focal motor or sensory deficits noted.  Resolving erythema, packing in place    Significant Labs: All pertinent labs within the past 24 hours have been reviewed.    Significant Imaging: I have reviewed and interpreted all pertinent imaging results/findings.    Assessment/Plan:     Other contact with alligator, initial encounter    Henok Willett is a 43 y.o. male with left dorsal thumb abscess three weeks after thumb laceration with associated alligator contamination.    - Ok to dc to home this afternoon after he completes additional IVAB today. To pull packing and begin betadine soaks tomorrow. Please send home with supplies for soaks. 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   - To f/u in ortho clinic     betadine soaks: mix 1/2 betadine and 1/2 saline, soak thumb in solution for 20 minutes 2x/day                     Ap Waller MD  Orthopedics  [facility]

## 2018-10-02 NOTE — PROGRESS NOTES
Admit note:    Report received. Care assumed. Patient arrived via stretcher, but ambulated from barnett to bed. PT AAO x4. Pt lying supine in bed with spouse at bedside.. Pt C/O N/V, and pain to left hand upon assessment. Left hand wrapped with gauze and ace wrap. Also pt has IVAB running in 18g to RAC. Phenergan, and pain meds given as ordered. See assessment. Patient oriented to room. SCD's and JERSON 's in place. Bed in lowest position, side rails up x2, bed wheels locked and call light within reach, NADK. Will continue to monitor.

## 2018-10-02 NOTE — HPI
"42 yo M with pmhc of tobacco abuse, etoh abuse transferred here for orthopedics evaluation s/p cut to left dorsal thumb 3 weeks ago awhile skinning an alligator. Pt states that he cut his hand but it scabbed over and appeared to be healing until x2 days ago.  He acutely developed increased pain, redness, and swelling L dorsal thumb with increased stiffness and reduced ROM. He also noted yellow purulent leakage from the wound.  He was seen @ [facility], give IV zosyn and vancomycin, and transferred here for orthopedics eval.  Complains of pain over dorsum of distal IP joint and pain with ROM. Pt denies f/c.  He fishes for alligators for a living.      Pt has had increase in pain and swelling as well as "yellow and white puss" leakage. Pt endorses pain and swelling. Pt has limited ROM to extremity/unable to bend at joint. No visible purulent drainage or foul odor. Pt in NAD. Pt denies fevers/chills.    In ED, ortho evalulated patient and underwent I&D.  Patient afebrile, no leukocytosis, HDS.  LA 1.1.  Cultures drawn in ED.  Started on doxy and cipro.    "

## 2018-10-02 NOTE — ED PROVIDER NOTES
"Encounter Date: 10/1/2018       History     Chief Complaint   Patient presents with    Wound Infection     Pt reports cutting left thumb with knife while skinning an alligator. Pt states "I worked for a month like that with no problems. Two days ago it started thobbing and the pain got worse."     42 yo M transferred here for orthopedics evaluation. Pt cut the thumb a few weeks ago and has since developed increased pain, redness, and swelling.   He was seen @ [facility], give IV ABX, and transferred here for orthopedics eval.           Review of patient's allergies indicates:   Allergen Reactions    Wasp sting [allergen ext-venom-honey bee] Swelling     Past Medical History:   Diagnosis Date    Depression     Hiatal hernia     Status post hip surgery     right    Testicle lump     surgery     Past Surgical History:   Procedure Laterality Date    hemorroidectomy      HIP SURGERY      PROCEDURE PROLAPSE HEMORRHOID (PPH) N/A 3/17/2017    Performed by [facility] Saha MD at [facility] OR    Right Testicle Surgery      SIGMOIDOSCOPY-FLEXIBLE N/A 3/17/2017    Performed by [facility] MD at [facility] OR     Family History   Problem Relation Age of Onset    Alcohol abuse Father      Social History     Tobacco Use    Smoking status: Current Every Day Smoker     Packs/day: 1.00     Years: 30.00     Pack years: 30.00     Types: Cigarettes    Smokeless tobacco: Never Used   Substance Use Topics    Alcohol use: Yes     Alcohol/week: 0.0 oz     Comment: daily drinker (12-15 drinks a day)    Drug use: Yes     Types: Marijuana, Cocaine     Comment: every day marijuana, cocaine 2 times a week     Review of Systems   Constitutional: Negative for fever.   HENT: Negative for sore throat.    Respiratory: Negative for shortness of breath.    Cardiovascular: Negative for chest pain.   Gastrointestinal: Negative for nausea.   Genitourinary: Negative for dysuria.   Musculoskeletal: Positive for arthralgias and joint swelling. Negative " for back pain.   Skin: Negative for rash.   Neurological: Negative for weakness.   Hematological: Does not bruise/bleed easily.       Physical Exam     Initial Vitals [10/01/18 2103]   BP Pulse Resp Temp SpO2   124/65 68 16 97.7 °F (36.5 °C) 97 %      MAP       --         Physical Exam    Constitutional: Vital signs are normal. He appears well-developed and well-nourished.   HENT:   Head: Normocephalic and atraumatic.   Eyes: Conjunctivae are normal.   Cardiovascular: Normal rate and regular rhythm.   Pulmonary/Chest: No respiratory distress. He has no wheezes. He has no rhonchi. He has no rales. He exhibits no tenderness.   Abdominal: Soft. Normal appearance and bowel sounds are normal. He exhibits no distension and no mass. There is no tenderness. There is no rebound and no guarding.   Musculoskeletal:   Left thumb is swollen, Dried, crusting wound to the IP joint.   Pt able to move MCP, unable to flex @ the IP joint, Pain along the flexor tendon, area is warm and red   Neurological: He is alert and oriented to person, place, and time.   Skin: Skin is warm and intact.   Psychiatric: He has a normal mood and affect. His speech is normal and behavior is normal. Cognition and memory are normal.         ED Course   Procedures  Labs Reviewed   CULTURE, ANAEROBIC   CULTURE, AEROBIC  (SPECIFY SOURCE)   PREALBUMIN   URINALYSIS, REFLEX TO URINE CULTURE   SEDIMENTATION RATE   C-REACTIVE PROTEIN   LACTIC ACID, PLASMA          Imaging Results    None          Medical Decision Making:   ED Management:  42 yo M transferred for Ortho evaluation of a possible tenosynovitis  Will consult ortho  Ortho has performed an I&D at the bedside  Patient will be admitted to Hospital Medicine for IV antibiotic therapy  Given the patient's recent exposure to water have added doxycycline to the antibiotic regimen                      Clinical Impression:   The encounter diagnosis was Cellulitis, unspecified cellulitis site.      Disposition:    Disposition: Admitted  Condition: Stable                        Óscar Rapp PA-C  10/01/18 9640

## 2018-10-02 NOTE — ASSESSMENT & PLAN NOTE
Drinks 3-12 beers daily; withdrawal can occur as soon as 6 hours post consumption. Does not appear in withdrawal on admission  - CIWA score every 8 hours, sz precautions, aspiration precautions  - check vitamin B1, folic acid  - start MV, folic acid, thiamine daily  - mIVF  - no hx of liver disease, counseled on harmful effects of ETOH

## 2018-10-02 NOTE — NURSING
Pt discharged to home via wheelchair. Pt given prescriptions, soaking supplies and instructions,  and copy of discharge home instructions. Pt denies pain at the this time. Pt educated on signs and symptoms of when to call the doctor. All belongings with the patient. Pt verbalized understanding of all discharge instructions.

## 2018-10-02 NOTE — PLAN OF CARE
10/02/18 1000   Discharge Assessment   Assessment Type Discharge Planning Assessment   Confirmed/corrected address and phone number on facesheet? Yes   Assessment information obtained from? Patient;Medical Record   Expected Length of Stay (days) 1   Communicated expected length of stay with patient/caregiver yes   Prior to hospitilization cognitive status: Alert/Oriented   Prior to hospitalization functional status: Independent   Current cognitive status: Alert/Oriented   Current Functional Status: Independent   Lives With significant other   Able to Return to Prior Arrangements yes   Is patient able to care for self after discharge? Yes   Patient's perception of discharge disposition home or selfcare   Readmission Within The Last 30 Days no previous admission in last 30 days   Patient currently being followed by outpatient case management? No   Patient currently receives any other outside agency services? No   Equipment Currently Used at Home none   Do you have any problems affording any of your prescribed medications? No   Is the patient taking medications as prescribed? yes   Does the patient have transportation home? Yes   Transportation Available family or friend will provide   Does the patient receive services at the Coumadin Clinic? No   Discharge Plan A Home   Placed in [facility] for Abscess of left thumb. Lives with SO and is independent in his ADLs. Plan is to DC home. No DC needs identified.

## 2018-10-02 NOTE — ASSESSMENT & PLAN NOTE
Henok Willett is a 43 y.o. male with left dorsal thumb abscess three weeks after thumb laceration with associated alligator contamination.    - Ok to dc to home this afternoon after he completes additional IVAB today. To pull packing and begin betadine soaks tomorrow. Please send home with supplies for soaks. 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   - To f/u in ortho clinic     betadine soaks: mix 1/2 betadine and 1/2 saline, soak thumb in solution for 20 minutes 2x/day

## 2018-10-02 NOTE — CONSULTS
"[facility]  Orthopedics  Consult Note    Patient Name: Henok Willett  MRN: [id number]  Admission Date: 10/1/2018  Hospital Length of Stay: 0 days  Attending Provider: Christiano Woodward MD  Primary Care Provider: Kapil Eng Jr, MD    Inpatient consult to Orthopedic Surgery  Consult performed by: Ap Waller MD  Consult ordered by: Óscar Rapp PA-C        Subjective:     Principal Problem:Other contact with alligator, initial encounter    Chief Complaint:   Chief Complaint   Patient presents with    Wound Infection     Pt reports cutting left thumb with knife while skinning an alligator. Pt states "I worked for a month like that with no problems. Two days ago it started thobbing and the pain got worse."        HPI: 44 yo M transferred here for orthopedics evaluation s/p cut to left dorsal thumb 3 weeks ago with associated alligator contaminating cleaning alligators. Pt cut the thumb a few weeks ago and has since developed increased pain, redness, and swelling over the past 3 days. He was seen @ [facility], give IV ABX, and transferred here for orthopedics eval.  Complains of pain over dorsum of IP joint and pain with ROM.No pain over flexor tendon or in web space.         Past Medical History:   Diagnosis Date    Depression     Hiatal hernia     Status post hip surgery     right    Testicle lump     surgery       Past Surgical History:   Procedure Laterality Date    hemorroidectomy      HIP SURGERY      PROCEDURE PROLAPSE HEMORRHOID (PPH) N/A 3/17/2017    Performed by [facility] at [facility]    Right Testicle Surgery      SIGMOIDOSCOPY-FLEXIBLE N/A 3/17/2017    Performed by [facility] at [facility]       Review of patient's allergies indicates:   Allergen Reactions    Wasp sting [allergen ext-venom-honey bee] Swelling       Current Facility-Administered Medications   Medication    lidocaine HCL 10 mg/ml (1%) injection 20 mL     Current Outpatient Medications " "  Medication Sig    cyclobenzaprine (FLEXERIL) 10 MG tablet      Family History     Problem Relation (Age of Onset)    Alcohol abuse Father        Tobacco Use    Smoking status: Current Every Day Smoker     Packs/day: 1.00     Years: 30.00     Pack years: 30.00     Types: Cigarettes    Smokeless tobacco: Never Used   Substance and Sexual Activity    Alcohol use: Yes     Alcohol/week: 0.0 oz     Comment: daily drinker (12-15 drinks a day)    Drug use: Yes     Types: Marijuana, Cocaine     Comment: every day marijuana, cocaine 2 times a week    Sexual activity: Yes     ROS   Constitutional: negative for fevers  Eyes: no visual changes  ENT: negative for hearing loss  Respiratory: negative for dyspnea  Cardiovascular: negative for chest pain  Gastrointestinal: negative for abdominal pain  Genitourinary: negative for dysuria  Neurological: negative for headaches  Behavioral/Psych: negative for hallucinations  Endocrine: negative for temperature intolerance    Objective:     Vital Signs (Most Recent):  Temp: 97.7 °F (36.5 °C) (10/01/18 2103)  Pulse: 68 (10/01/18 2103)  Resp: 16 (10/01/18 2103)  BP: 124/65 (10/01/18 2103)  SpO2: 97 % (10/01/18 2103) Vital Signs (24h Range):  Temp:  [97.7 °F (36.5 °C)-98.2 °F (36.8 °C)] 97.7 °F (36.5 °C)  Pulse:  [68-92] 68  Resp:  [16-18] 16  SpO2:  [97 %-100 %] 97 %  BP: (110-136)/(74-83) 124/65     Weight: 68 kg (150 lb)  Height: 5' 11" (180.3 cm)  Body mass index is 20.92 kg/m².    No intake or output data in the 24 hours ending 10/01/18 2239    Ortho/SPM Exam  Physical Exam:  Gen:  No acute distress  CV:  Peripherally well-perfused.  Pulses 2+ bilaterally.  Lungs:  Normal respiratory effort.  Abdomen:  Soft, non-tender, non-distended  Head/Neck:  Normocephalic.  Atraumatic. No TTP, AROM and PROM intact without pain  Neuro:  CN intact without deficit, SILT throughout B/L Upper & Lower Extremities  Pelvis: No TTP, Stable to direct anterior pressure over ASIS.    LEFT UPPER " EXTREMITY:     INSPECTION  - Left thumb is swollen dorsally over IP joint , Dried, crusting wound to the IP joint. There is 1 cm visible abscess.    PALPATION  - TTP over dorsal thumb. Palpable fluctuance.   RANGE OF MOTION  - AROM and PROM intact at the MCP  NEUROVASCULAR  - AIN/PIN/Radial/Median/Ulnar Nerves assessed in isolation without deficit  - SILT throughout  - Radial & Ulnar arteries palpated 2+  - Capillary Refill <3s    Significant Labs: All pertinent labs within the past 24 hours have been reviewed.    Significant Imaging: I have reviewed and interpreted all pertinent imaging results/findings.     No acute fx or foreign object.     Assessment/Plan:     * Other contact with alligator, initial encounter    Henok Willett is a 43 y.o. male with left dorsal thumb abscess three weeks after thumb laceration with associated alligator contamination. No condern for flexor teno or web space abscess.     - I and done in ED under local sedation. Wound packed, will keep packed for 24-48 hours and then begin betadine soaks. To be admitted to medicine overnight for IVAB. NPO at midnight as precation, keep elevated on IV pole at all times.    After time out was performed and patient ID, side, and site were verified, the left thumb was sterilly prepped in the standard fashion.  A digital block was performed by injecting 5cc of 1% lidocaine dorsally into the radial and ulnar side. Adequate anesthesia was obtained. A 1 cm Incision was made on dorsal side of thumb. A hemostat was introduced and all deep loculations were broken up. A small amount of purulence was expressed and sent for culture. Wound was thoroughly irrigated with betadine and 1L normal sailine.  The incision site was then packed to allow for continued drainage. It was then dressed with zeroform, gauze and cast padding.  Patient tolerated the procedure well with minimal blood loss. Instructed to keep clean and dry for 24-48 hours and to then begin betadine  ayesha.                  Ap Waller MD  Orthopedics  [facility]

## 2018-10-02 NOTE — ASSESSMENT & PLAN NOTE
Henok Willett is a 43 y.o. male with left dorsal thumb abscess three weeks after thumb laceration with associated alligator contamination.    - I and done in ED under local sedation. Wound packed, will keep packed for 24 hours and then begin betadine soaks. To be admitted to medicine overnight for IVAB. NPO at midnight as precation, keep elevated on IV pole at all times.    After time out was performed and patient ID, side, and site were verified, the left thumb was sterilly prepped in the standard fashion.  A digital block was performed by injecting 5cc of 1% lidocaine dorsally into the radial and ulnar side. Adequate anesthesia was obtained. A 1 cm Incision was made on dorsal side of thumb. A hemostat was introduced and all deep loculations were broken up. A small amount of purulence was expressed and sent for culture. Wound was thoroughly irrigated with betadine and 1L normal sailine.  The incision site was then packed to allow for continued drainage. It was then dressed with zeroform, gauze and cast padding.  Patient tolerated the procedure well with minimal blood loss. Instructed to keep clean and dry for 24-48 hours and to then begin betadine soaks.

## 2018-10-02 NOTE — H&P
"[facility] Hospital Medicine  History & Physical    Patient Name: Henok Willett  MRN: [id number]  Admission Date: 10/1/2018  Attending Physician: Jose Bowers MD   Primary Care Provider: Kapil Eng Jr, MD    [facility] Medicine Team: Networked reference to record PCT  Eric Ramos PA-C     Patient information was obtained from patient, past medical records and ER records.     Subjective:     Principal Problem:Abscess of finger of left hand    Chief Complaint:   Chief Complaint   Patient presents with    Wound Infection     Pt reports cutting left thumb with knife while skinning an alligator. Pt states "I worked for a month like that with no problems. Two days ago it started thobbing and the pain got worse."        HPI: 42 yo M with pmhc of tobacco abuse, etoh abuse transferred here for orthopedics evaluation s/p cut to left dorsal thumb 3 weeks ago awhile skinning an alligator. Pt states that he cut his hand but it scabbed over and appeared to be healing until x2 days ago.  He acutely developed increased pain, redness, and swelling L dorsal thumb with increased stiffness and reduced ROM. He also noted yellow purulent leakage from the wound.  He was seen @ [facility], give IV zosyn and vancomycin, and transferred here for orthopedics eval.  Complains of pain over dorsum of distal IP joint and pain with ROM. Pt denies f/c.  He fishes for alligators for a living.      Pt has had increase in pain and swelling as well as "yellow and white puss" leakage. Pt endorses pain and swelling. Pt has limited ROM to extremity/unable to bend at joint. No visible purulent drainage or foul odor. Pt in NAD. Pt denies fevers/chills.    In ED, ortho evalulated patient and underwent I&D.  Patient afebrile, no leukocytosis, HDS.  LA 1.1.  Cultures drawn in ED.  Started on doxy and cipro.        Past Medical History:   Diagnosis Date    Depression     Hiatal hernia     Status post hip surgery     right    " Testicle lump     surgery       Past Surgical History:   Procedure Laterality Date    hemorroidectomy      HIP SURGERY      PROCEDURE PROLAPSE HEMORRHOID (PPH) N/A 3/17/2017    [facility]    Right Testicle Surgery      SIGMOIDOSCOPY-FLEXIBLE N/A 3/17/2017    Performed by [facility] at [facility]       Review of patient's allergies indicates:   Allergen Reactions    Wasp sting [allergen ext-venom-honey bee] Swelling       Current Facility-Administered Medications on File Prior to Encounter   Medication    [COMPLETED] morphine injection 4 mg    [COMPLETED] morphine injection 4 mg    [COMPLETED] morphine injection 4 mg    [COMPLETED] ondansetron injection 4 mg    [COMPLETED] piperacillin-tazobactam 4.5 g in dextrose 5 % 100 mL IVPB (ready to mix system)    [COMPLETED] vancomycin in dextrose 5 % 1 gram/250 mL IVPB 1,000 mg    [DISCONTINUED] nicotine 21 mg/24 hr 1 patch     Current Outpatient Medications on File Prior to Encounter   Medication Sig    cyclobenzaprine (FLEXERIL) 10 MG tablet      Family History     Problem Relation (Age of Onset)    Alcohol abuse Father        Tobacco Use    Smoking status: Current Every Day Smoker     Packs/day: 1.00     Years: 30.00     Pack years: 30.00     Types: Cigarettes    Smokeless tobacco: Never Used   Substance and Sexual Activity    Alcohol use: Yes     Alcohol/week: 0.0 oz     Comment: daily drinker (12-15 drinks a day)    Drug use: Yes     Types: Marijuana, Cocaine     Comment: every day marijuana, cocaine 2 times a week    Sexual activity: Yes     Review of Systems   Constitutional: Negative for chills, fever and unexpected weight change.   HENT: Negative for congestion and ear pain.    Respiratory: Negative for cough and shortness of breath.    Cardiovascular: Negative for chest pain and palpitations.   Gastrointestinal: Negative for abdominal pain, diarrhea, nausea and vomiting.   Genitourinary: Negative for  difficulty urinating and dysuria.   Musculoskeletal: Negative for back pain and gait problem.   Skin: Positive for wound (L dorsum thumb). Negative for rash.   Neurological: Negative for dizziness and light-headedness.   Psychiatric/Behavioral: Negative for agitation and confusion.     Objective:     Vital Signs (Most Recent):  Temp: 97.7 °F (36.5 °C) (10/01/18 2103)  Pulse: 72 (10/02/18 0006)  Resp: 17 (10/02/18 0006)  BP: 120/72 (10/02/18 0002)  SpO2: 97 % (10/02/18 0006) Vital Signs (24h Range):  Temp:  [97.7 °F (36.5 °C)-98.2 °F (36.8 °C)] 97.7 °F (36.5 °C)  Pulse:  [68-92] 72  Resp:  [16-18] 17  SpO2:  [97 %-100 %] 97 %  BP: (110-136)/(67-83) 120/72     Weight: 68 kg (150 lb)  Body mass index is 20.92 kg/m².    Physical Exam   Constitutional: He is oriented to person, place, and time. He appears well-developed and well-nourished.   HENT:   Head: Normocephalic and atraumatic.   Eyes: Conjunctivae and EOM are normal. Pupils are equal, round, and reactive to light.   Neck: Normal range of motion. Neck supple.   Cardiovascular: Normal rate, regular rhythm, normal heart sounds and intact distal pulses.   No murmur heard.  Pulmonary/Chest: Effort normal and breath sounds normal. No stridor. No respiratory distress. He has no wheezes.   Abdominal: Soft. Bowel sounds are normal. He exhibits no distension. There is no tenderness.   Musculoskeletal: Normal range of motion.   L hand wrapped thoroughly and elevated on IV pole   Neurological: He is alert and oriented to person, place, and time.   Skin: Skin is warm and dry.   Unclean fingernails, calloused hands   Psychiatric: He has a normal mood and affect. His behavior is normal.   Nursing note and vitals reviewed.        CRANIAL NERVES     CN III, IV, VI   Pupils are equal, round, and reactive to light.  Extraocular motions are normal.        Significant Labs:   CBC:   Recent Labs   Lab  10/01/18   1713   WBC  6.92   HGB  16.4   HCT  47.5   PLT  308     CMP:   Recent  Labs   Lab  10/01/18   1713   NA  142   K  4.0   CL  104   CO2  26   GLU  44*   BUN  7   CREATININE  0.90   CALCIUM  9.8   PROT  9.2*   ALBUMIN  5.2   BILITOT  0.6   ALKPHOS  81   AST  32   ALT  18   ANIONGAP  12   EGFRNONAA  >60.0     Lactic Acid:   Recent Labs   Lab  10/01/18   2313   LACTATE  1.1       Significant Imaging: I have reviewed all pertinent imaging results/findings within the past 24 hours.     Assessment/Plan:     * Abscess of finger of left hand    Cellulitis  Other contact with alligator, initial encounter  - evaluated by ortho and I&D performed   - cultures taken, hand wrapped   - NPO MN as precaution   - keep LUE elevated  - given Zosyn and vanc prior to tx.  C/w doxy and cipro for now.  - CRP and ESR WNL  - no leukocytosis, HDS, afebrile, LA 1.1  - PRN pain control        ETOH abuse    Drinks 3-12 beers daily; withdrawal can occur as soon as 6 hours post consumption. Does not appear in withdrawal on admission  - CIWA score every 8 hours, sz precautions, aspiration precautions  - check vitamin B1, folic acid  - start MV, folic acid, thiamine daily  - mIVF  - no hx of liver disease, counseled on harmful effects of ETOH        Tobacco abuse    - nicotine patch          VTE Risk Mitigation (From admission, onward)        Ordered     IP VTE HIGH RISK PATIENT  Once      10/02/18 0014     Place JERSON hose  Until discontinued      10/02/18 0014     Place sequential compression device  Until discontinued      10/02/18 0014             Eric Ramos PA-C  Department of Hospital Medicine   [facility]

## 2018-10-02 NOTE — HPI
42 yo M transferred here for orthopedics evaluation s/p cut to left dorsal thumb 3 weeks ago with associated alligator contaminating cleaning alligators. Pt cut the thumb a few weeks ago and has since developed increased pain, redness, and swelling over the past 3 days. [facility] and transferred here for orthopedics eval. Complains of pain over dorsum of IP joint and pain with ROM.No pain over flexor tendon or in web space.

## 2018-10-02 NOTE — SUBJECTIVE & OBJECTIVE
Past Medical History:   Diagnosis Date    Depression     Hiatal hernia     Status post hip surgery     right    Testicle lump     surgery       Past Surgical History:   Procedure Laterality Date    hemorroidectomy      HIP SURGERY      PROCEDURE PROLAPSE HEMORRHOID (PPH) N/A 3/17/2017    [facility]    Right Testicle Surgery      SIGMOIDOSCOPY-FLEXIBLE N/A 3/17/2017    Performed by [facility] at [facility]       Review of patient's allergies indicates:   Allergen Reactions    Wasp sting [allergen ext-venom-honey bee] Swelling       Current Facility-Administered Medications on File Prior to Encounter   Medication    [COMPLETED] morphine injection 4 mg    [COMPLETED] morphine injection 4 mg    [COMPLETED] morphine injection 4 mg    [COMPLETED] ondansetron injection 4 mg    [COMPLETED] piperacillin-tazobactam 4.5 g in dextrose 5 % 100 mL IVPB (ready to mix system)    [COMPLETED] vancomycin in dextrose 5 % 1 gram/250 mL IVPB 1,000 mg    [DISCONTINUED] nicotine 21 mg/24 hr 1 patch     Current Outpatient Medications on File Prior to Encounter   Medication Sig    cyclobenzaprine (FLEXERIL) 10 MG tablet      Family History     Problem Relation (Age of Onset)    Alcohol abuse Father        Tobacco Use    Smoking status: Current Every Day Smoker     Packs/day: 1.00     Years: 30.00     Pack years: 30.00     Types: Cigarettes    Smokeless tobacco: Never Used   Substance and Sexual Activity    Alcohol use: Yes     Alcohol/week: 0.0 oz     Comment: daily drinker (12-15 drinks a day)    Drug use: Yes     Types: Marijuana, Cocaine     Comment: every day marijuana, cocaine 2 times a week    Sexual activity: Yes     Review of Systems   Constitutional: Negative for chills, fever and unexpected weight change.   HENT: Negative for congestion and ear pain.    Respiratory: Negative for cough and shortness of breath.    Cardiovascular: Negative for chest pain and palpitations.    Gastrointestinal: Negative for abdominal pain, diarrhea, nausea and vomiting.   Genitourinary: Negative for difficulty urinating and dysuria.   Musculoskeletal: Negative for back pain and gait problem.   Skin: Positive for wound (L dorsum thumb). Negative for rash.   Neurological: Negative for dizziness and light-headedness.   Psychiatric/Behavioral: Negative for agitation and confusion.     Objective:     Vital Signs (Most Recent):  Temp: 97.7 °F (36.5 °C) (10/01/18 2103)  Pulse: 72 (10/02/18 0006)  Resp: 17 (10/02/18 0006)  BP: 120/72 (10/02/18 0002)  SpO2: 97 % (10/02/18 0006) Vital Signs (24h Range):  Temp:  [97.7 °F (36.5 °C)-98.2 °F (36.8 °C)] 97.7 °F (36.5 °C)  Pulse:  [68-92] 72  Resp:  [16-18] 17  SpO2:  [97 %-100 %] 97 %  BP: (110-136)/(67-83) 120/72     Weight: 68 kg (150 lb)  Body mass index is 20.92 kg/m².    Physical Exam   Constitutional: He is oriented to person, place, and time. He appears well-developed and well-nourished.   HENT:   Head: Normocephalic and atraumatic.   Eyes: Conjunctivae and EOM are normal. Pupils are equal, round, and reactive to light.   Neck: Normal range of motion. Neck supple.   Cardiovascular: Normal rate, regular rhythm, normal heart sounds and intact distal pulses.   No murmur heard.  Pulmonary/Chest: Effort normal and breath sounds normal. No stridor. No respiratory distress. He has no wheezes.   Abdominal: Soft. Bowel sounds are normal. He exhibits no distension. There is no tenderness.   Musculoskeletal: Normal range of motion.   L hand wrapped thoroughly and elevated on IV pole   Neurological: He is alert and oriented to person, place, and time.   Skin: Skin is warm and dry.   Unclean fingernails, calloused hands   Psychiatric: He has a normal mood and affect. His behavior is normal.   Nursing note and vitals reviewed.        CRANIAL NERVES     CN III, IV, VI   Pupils are equal, round, and reactive to light.  Extraocular motions are normal.        Significant Labs:    CBC:   Recent Labs   Lab  10/01/18   1713   WBC  6.92   HGB  16.4   HCT  47.5   PLT  308     CMP:   Recent Labs   Lab  10/01/18   1713   NA  142   K  4.0   CL  104   CO2  26   GLU  44*   BUN  7   CREATININE  0.90   CALCIUM  9.8   PROT  9.2*   ALBUMIN  5.2   BILITOT  0.6   ALKPHOS  81   AST  32   ALT  18   ANIONGAP  12   EGFRNONAA  >60.0     Lactic Acid:   Recent Labs   Lab  10/01/18   2313   LACTATE  1.1       Significant Imaging: I have reviewed all pertinent imaging results/findings within the past 24 hours.

## 2018-10-02 NOTE — TELEPHONE ENCOUNTER
Called pt left  , appt made to see  in [facility] tomorrow at 9:30 , cellulitis of the left thumb. Address and office phone number to office left in .

## 2018-10-02 NOTE — SUBJECTIVE & OBJECTIVE
Past Medical History:   Diagnosis Date    Depression     Hiatal hernia     Status post hip surgery     right    Testicle lump     surgery       Past Surgical History:   Procedure Laterality Date    hemorroidectomy      HIP SURGERY      PROCEDURE PROLAPSE HEMORRHOID (PPH) N/A 3/17/2017    Performed by [facility] at [facility]    Right Testicle Surgery      SIGMOIDOSCOPY-FLEXIBLE N/A 3/17/2017    Performed by [facility] at [facility]       Review of patient's allergies indicates:   Allergen Reactions    Wasp sting [allergen ext-venom-honey bee] Swelling       Medications:  No medications prior to admission.     Antibiotics (From admission, onward)    None        Antifungals (From admission, onward)    None        Antivirals (From admission, onward)    None             There is no immunization history on file for this patient.    Family History     Problem Relation (Age of Onset)    Alcohol abuse Father        Social History     Socioeconomic History    Marital status: Legally      Spouse name: None    Number of children: 1    Years of education: None    Highest education level: None   Social Needs    Financial resource strain: None    Food insecurity - worry: None    Food insecurity - inability: None    Transportation needs - medical: None    Transportation needs - non-medical: None   Occupational History    None   Tobacco Use    Smoking status: Current Every Day Smoker     Packs/day: 1.00     Years: 30.00     Pack years: 30.00     Types: Cigarettes    Smokeless tobacco: Never Used   Substance and Sexual Activity    Alcohol use: Yes     Alcohol/week: 0.0 oz     Comment: daily drinker (12-15 drinks a day)    Drug use: Yes     Types: Marijuana, Cocaine     Comment: every day marijuana, cocaine 2 times a week    Sexual activity: Yes   Other Topics Concern    Patient feels they ought to cut down on drinking/drug use Not Asked    Patient annoyed by others criticizing their  drinking/drug use Not Asked    Patient has felt bad or guilty about drinking/drug use Not Asked    Patient has had a drink/used drugs as an eye opener in the AM Not Asked   Social History Narrative    None     Review of Systems   Constitutional: Negative for chills, fever and unexpected weight change.   HENT: Negative for congestion and ear pain.    Respiratory: Negative for cough and shortness of breath.    Cardiovascular: Negative for chest pain and palpitations.   Gastrointestinal: Negative for abdominal pain, diarrhea, nausea and vomiting.   Genitourinary: Negative for difficulty urinating and dysuria.   Musculoskeletal: Positive for joint swelling. Negative for back pain and gait problem.        Pain left   thumb     Skin: Positive for wound (L dorsum thumb). Negative for rash.   Neurological: Negative for dizziness and light-headedness.   Psychiatric/Behavioral: Negative for agitation and confusion.     Objective:     Vital Signs (Most Recent):  Temp: 97 °F (36.1 °C) (10/02/18 1056)  Pulse: 82 (10/02/18 1056)  Resp: 18 (10/02/18 1056)  BP: 139/87 (10/02/18 1056)  SpO2: 99 % (10/02/18 1056) Vital Signs (24h Range):  Temp:  [96.4 °F (35.8 °C)-98.2 °F (36.8 °C)] 97 °F (36.1 °C)  Pulse:  [45-88] 82  Resp:  [16-18] 18  SpO2:  [97 %-100 %] 99 %  BP: (115-139)/(67-87) 139/87     Weight: 66.2 kg (145 lb 15.1 oz)  Body mass index is 21.55 kg/m².    Estimated Creatinine Clearance: 98.1 mL/min (based on SCr of 0.9 mg/dL).    Physical Exam   Constitutional: He is oriented to person, place, and time. He appears well-developed and well-nourished.   HENT:   Head: Normocephalic and atraumatic.   Eyes: Conjunctivae are normal. No scleral icterus.   Cardiovascular: Normal rate and regular rhythm.   Pulmonary/Chest: Effort normal. No respiratory distress.   Musculoskeletal:   Left hand in surgical dressing/sling. Dressing c/d/i   Neurological: He is alert and oriented to person, place, and time.   Skin: Skin is warm and dry.    Vitals reviewed.      Significant Labs:   Blood Culture:   Recent Labs   Lab  10/01/18   1714  10/01/18   1729   LABBLOO  No Growth to date  No Growth to date     CBC:   Recent Labs   Lab  10/01/18   1713  10/02/18   0505   WBC  6.92  6.96   HGB  16.4  14.7   HCT  47.5  45.8   PLT  308  256     CMP:   Recent Labs   Lab  10/01/18   1713  10/02/18   0505   NA  142  138   K  4.0  4.2   CL  104  100   CO2  26  27   GLU  44*  91   BUN  7  7   CREATININE  0.90  0.9   CALCIUM  9.8  9.4   PROT  9.2*  7.5   ALBUMIN  5.2  3.9   BILITOT  0.6  0.9   ALKPHOS  81  83   AST  32  21   ALT  18  12   ANIONGAP  12  11   EGFRNONAA  >60.0  >60.0     Wound Culture: No results for input(s): LABAERO in the last 4320 hours.    Significant Imaging: I have reviewed all pertinent imaging results/findings within the past 24 hours.

## 2018-10-02 NOTE — TELEPHONE ENCOUNTER
----- Message from Kennedy Ansari sent at 10/2/2018  2:04 PM CDT -----  Contact: Elizabeth-case mgmt [id number]  Please call pt at [phone].  Need an Ortho f/u due to having thumb abcess.  Patient was discharged on today    Thank you

## 2018-10-02 NOTE — ED NOTES
"Chief Complaint   Patient presents with    Wound Infection     Pt reports cutting left thumb with knife while skinning an alligator. Pt states "I worked for a month like that with no problems. Two days ago it started thobbing and the pain got worse."     Pt presents to ED for a wound infection to his left thumb he sustained after skinning an alligator multiple weeks ago. Pt has had increase in pain and swelling as well as "yellow and white puss" leakage. Pt endorses pain and swelling. Pt has limited ROM to extremity/unable to bend at joint. No visible purulent drainage or foul odor. Pt in NAD. Pt denies fevers/chills.    Adult Physical Assessment:    Appearance: Patient resting comfortably on stretcher, and is in no acute distress at this time. Patient is clean and well groomed, with clothing properly fastened. Patient has no facial grimacing, and no indications of being in pain currently.    Cardiac: WNL. Patient attached to continuous cardiac monitor, automatic/cycling BP cuff, and continuous pulse ox. Patient has a normal rate and regular rhythm. No peripheral edema noted.    Neuro/LOC: Eyes open spontaneously, behavior appropriate to situation, follows commands, facial expression symmetrical, purposeful motor response noted. AAOx4; The patient is awake, alert and aware of environment with an appropriate affect, and speaking appropriately. Patient denies dizziness and HA.    Respiratory:Airway is open and patent, respirations are spontaneous, even, and unlabored. Normal effort and rate noted, and without accessory muscle use.    Skin: Intact, warm and dry. Color is consistent with ethnicity. Normal skin turgor and moist mucous membranes.    Gastro: Bowel sounds audible and active x4 quadrants. Abdomen is soft, non-tender, and non-distended.    Musculoskeletal: +Edema to left thumb. No drainage at present. Pt c/o pain to site. Pulse intact.    -Patient identifiers verified and correct for this patient.  -Patient " resting with bedrails up x2 for safety, bed locked in low position, and call bell within reach.  -Allergy band applied to patient for safety.

## 2018-10-02 NOTE — SUBJECTIVE & OBJECTIVE
Past Medical History:   Diagnosis Date    Depression     Hiatal hernia     Status post hip surgery     right    Testicle lump     surgery       Past Surgical History:   Procedure Laterality Date    hemorroidectomy      HIP SURGERY      PROCEDURE PROLAPSE HEMORRHOID (PPH) N/A 3/17/2017    [facility]formed [facility]    Right Testicle Surgery      SIGMOIDOSCOPY-FLEXIBLE N/A 3/17/2017    [facility] at [facility]       Review of patient's allergies indicates:   Allergen Reactions    Wasp sting [allergen ext-venom-honey bee] Swelling       Current Facility-Administered Medications   Medication    lidocaine HCL 10 mg/ml (1%) injection 20 mL     Current Outpatient Medications   Medication Sig    cyclobenzaprine (FLEXERIL) 10 MG tablet      Family History     Problem Relation (Age of Onset)    Alcohol abuse Father        Tobacco Use    Smoking status: Current Every Day Smoker     Packs/day: 1.00     Years: 30.00     Pack years: 30.00     Types: Cigarettes    Smokeless tobacco: Never Used   Substance and Sexual Activity    Alcohol use: Yes     Alcohol/week: 0.0 oz     Comment: daily drinker (12-15 drinks a day)    Drug use: Yes     Types: Marijuana, Cocaine     Comment: every day marijuana, cocaine 2 times a week    Sexual activity: Yes     ROS   Constitutional: negative for fevers  Eyes: no visual changes  ENT: negative for hearing loss  Respiratory: negative for dyspnea  Cardiovascular: negative for chest pain  Gastrointestinal: negative for abdominal pain  Genitourinary: negative for dysuria  Neurological: negative for headaches  Behavioral/Psych: negative for hallucinations  Endocrine: negative for temperature intolerance    Objective:     Vital Signs (Most Recent):  Temp: 97.7 °F (36.5 °C) (10/01/18 2103)  Pulse: 68 (10/01/18 2103)  Resp: 16 (10/01/18 2103)  BP: 124/65 (10/01/18 2103)  SpO2: 97 % (10/01/18 2103) Vital Signs (24h Range):  Temp:  [97.7 °F (36.5 °C)-98.2  "°F (36.8 °C)] 97.7 °F (36.5 °C)  Pulse:  [68-92] 68  Resp:  [16-18] 16  SpO2:  [97 %-100 %] 97 %  BP: (110-136)/(74-83) 124/65     Weight: 68 kg (150 lb)  Height: 5' 11" (180.3 cm)  Body mass index is 20.92 kg/m².    No intake or output data in the 24 hours ending 10/01/18 2239    Ortho/SPM Exam  Physical Exam:  Gen:  No acute distress  CV:  Peripherally well-perfused.  Pulses 2+ bilaterally.  Lungs:  Normal respiratory effort.  Abdomen:  Soft, non-tender, non-distended  Head/Neck:  Normocephalic.  Atraumatic. No TTP, AROM and PROM intact without pain  Neuro:  CN intact without deficit, SILT throughout B/L Upper & Lower Extremities  Pelvis: No TTP, Stable to direct anterior pressure over ASIS.    LEFT UPPER EXTREMITY:     INSPECTION  - Left thumb is swollen dorsally over IP joint , Dried, crusting wound to the IP joint. There is 1 cm visible abscess.    PALPATION  - TTP over dorsal thumb. Palpable fluctuance.   RANGE OF MOTION  - AROM and PROM intact at the MCP  NEUROVASCULAR  - AIN/PIN/Radial/Median/Ulnar Nerves assessed in isolation without deficit  - SILT throughout  - Radial & Ulnar arteries palpated 2+  - Capillary Refill <3s    Significant Labs: All pertinent labs within the past 24 hours have been reviewed.    Significant Imaging: I have reviewed and interpreted all pertinent imaging results/findings.     No acute fx or foreign object.   "

## 2018-10-02 NOTE — HPI
Mr. Willett is a 43 year old man with pmh significant for ETOH abuse and tobacco abuse who was transferred her from OSH for orthopedic evaluation of a cut to his left dorsal thumb approximately 3 weeks ago which occurred when he was skinning an alligator.  It had appeared to be healing until about 3 days ago when he developed inceased pain, redness and swelling with purulent drainage. [facility] and was given IV zosyn and vancomycin and transfrred here.     He underwent bedside I&D by Ortho. Per Ortho there is no concern for flexor tenosynovitis or web space abscess.     Blood cultures NGTD  Aerobic and anaerobic cultures pending.

## 2018-10-02 NOTE — CONSULTS
[facility]  Infectious Disease  Consult Note    Patient Name: Henok Willett  MRN: [id number]  Admission Date: 10/1/2018  Hospital Length of Stay: 0 days  Attending Physician: LUH Muñoz MD  Primary Care Provider: Kapil Eng Jr, MD     Isolation Status: No active isolations      Inpatient consult to Infectious Diseases  Consult performed by: MARYJANE Davis, ANP  Consult ordered by: Ap Waller MD  Reason for consult: abscess with exposure to alligator now s/p I and D please advise as to best ab to go home with.           ID Consult acknowledged.   Full consult and recommendations to follow today.   In the interim, please call for any immediate concerns.     Thank you.   MARYJANE Ferrara, ANP-C  [phone] pager,  [phone]

## 2018-10-02 NOTE — SUBJECTIVE & OBJECTIVE
"Principal Problem:Abscess of finger of left hand    Principal Orthopedic Problem: same    Interval History: Patient seen and examined at bedside. Day 0 s/p I and D left thumb abscess.   No acute events overnight.  Pain controlled.  Resolving pain and swelling.      Review of patient's allergies indicates:   Allergen Reactions    Wasp sting [allergen ext-venom-honey bee] Swelling       Current Facility-Administered Medications   Medication    0.9%  NaCl infusion    acetaminophen tablet 650 mg    albuterol-ipratropium 2.5 mg-0.5 mg/3 mL nebulizer solution 3 mL    ciprofloxacin HCl tablet 500 mg    dextrose 50% injection 12.5 g    dextrose 50% injection 25 g    doxycycline (VIBRAMYCIN) 100 mg in dextrose 5 % 250 mL IVPB    folic acid tablet 1 mg    glucagon (human recombinant) injection 1 mg    glucose chewable tablet 16 g    glucose chewable tablet 24 g    ketorolac injection 15 mg    multivitamin tablet 1 tablet    nicotine 7 mg/24 hr 1 patch    ondansetron disintegrating tablet 4 mg    oxyCODONE immediate release tablet 5 mg    promethazine tablet 25 mg    ramelteon tablet 8 mg    senna-docusate 8.6-50 mg per tablet 1 tablet    sodium chloride 0.9% flush 5 mL    thiamine tablet 100 mg     Objective:     Vital Signs (Most Recent):  Temp: 97.7 °F (36.5 °C) (10/02/18 0455)  Pulse: (!) 45 (10/02/18 0455)  Resp: 18 (10/02/18 0455)  BP: 128/76 (10/02/18 0455)  SpO2: 98 % (10/02/18 0455) Vital Signs (24h Range):  Temp:  [97.7 °F (36.5 °C)-98.2 °F (36.8 °C)] 97.7 °F (36.5 °C)  Pulse:  [45-92] 45  Resp:  [16-18] 18  SpO2:  [97 %-100 %] 98 %  BP: (110-136)/(67-83) 128/76     Weight: 66.2 kg (145 lb 15.1 oz)  Height: 5' 9" (175.3 cm)  Body mass index is 21.55 kg/m².    No intake or output data in the 24 hours ending 10/02/18 0520    Ortho/SPM Exam  Physical Exam:  NAD, A/O x 3.  Wound c/d/i with clean dressing.  No focal motor or sensory deficits noted.  Resolving erythema, packing in place    Significant " Labs: All pertinent labs within the past 24 hours have been reviewed.    Significant Imaging: I have reviewed and interpreted all pertinent imaging results/findings.

## 2018-10-02 NOTE — ASSESSMENT & PLAN NOTE
Cellulitis  Other contact with alligator, initial encounter  - evaluated by ortho and I&D performed   - cultures taken, hand wrapped   - NPO MN as precaution   - keep LUE elevated  - given Zosyn and vanc prior to tx.  C/w doxy and cipro for now.  - CRP and ESR WNL  - no leukocytosis, HDS, afebrile, LA 1.1  - PRN pain control
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
## 2018-10-04 LAB
BACTERIA SPEC AEROBE CULT: NORMAL
BACTERIA SPEC AEROBE CULT: NORMAL
VIT B1 SERPL-MCNC: 52 UG/L (ref 38–122)

## 2018-10-04 NOTE — ASSESSMENT & PLAN NOTE
Drinks 3-12 beers daily; withdrawal can occur as soon as 6 hours post consumption. Does not appear in withdrawal on admission  - CIWA score every 8 hours, sz precautions, aspiration precautions  - check vitamin B1, folic acid  - start MV, folic acid, thiamine daily while admitted  - mIVF  - no hx of liver disease, counseled on harmful effects of ETOH

## 2018-10-04 NOTE — DISCHARGE SUMMARY
"[facility] Hospital Medicine  Discharge Summary      Patient Name: Henok Willett  MRN: [id number]  Admission Date: 10/1/2018  Hospital Length of Stay: 0 days  Discharge Date and Time: 10/2/2018 12:53 PM  Attending Physician: No att. providers found   Discharging Provider: Randolph Newton PA-C  Primary Care Provider: Bakari Tirado MD  [facility] Medicine Team: [facility] MED E Randolph Newton PA-C    HPI:   44 yo M with pmhc of tobacco abuse, etoh abuse transferred here for orthopedics evaluation s/p cut to left dorsal thumb 3 weeks ago awhile skinning an alligator. Pt states that he cut his hand but it scabbed over and appeared to be healing until x2 days ago.  He acutely developed increased pain, redness, and swelling L dorsal thumb with increased stiffness and reduced ROM. He also noted yellow purulent leakage from the wound.  He was seen @ [facility], give IV zosyn and vancomycin, and transferred here for orthopedics eval.  Complains of pain over dorsum of distal IP joint and pain with ROM. Pt denies f/c.  He fishes for alligators for a living.      Pt has had increase in pain and swelling as well as "yellow and white puss" leakage. Pt endorses pain and swelling. Pt has limited ROM to extremity/unable to bend at joint. No visible purulent drainage or foul odor. Pt in NAD. Pt denies fevers/chills.    In ED, ortho evalulated patient and underwent I&D.  Patient afebrile, no leukocytosis, HDS.  LA 1.1.  Cultures drawn in ED.  Started on doxy and cipro.      * No surgery found *      Hospital Course:   Patient admitted to observation for a L thumb abscess. I&D by orthopedics and wound culture grew staph aureus and morganella morganii-sensitive to cipro and doxy. Patient discharged with cipro and doxy; instructed to pull packing 10/04 and continue betadine soaks until orthopedic follow up in 1 week.      Consults:   Consults (From admission, onward)        Status Ordering Provider     Inpatient " consult to Infectious Diseases  Once     Provider:  (Not yet assigned)    Completed EVELIA DÍAZ     Inpatient consult to Orthopedic Surgery  Once     Provider:  (Not yet assigned)    Completed JENNIFER ONEIL          * Abscess of finger of left hand    Cellulitis  Other contact with alligator, initial encounter  - evaluated by ortho and I&D performed   - cultures taken, hand wrapped   - keep LUE elevated during admission  -Wound cultures grew staph aureus and morganella morganii sensitive to cipro and doxy  - given Zosyn and vanc prior to tx.  C/w doxy and cipro on admission and discharge  - CRP and ESR WNL  - no leukocytosis, HDS, afebrile, LA 1.1  - PRN pain control  -Patient instructed to pull packing 10/04 and continue betadine soaks until ortho f/u in one week        Tobacco abuse    - nicotine patch        ETOH abuse    Drinks 3-12 beers daily; withdrawal can occur as soon as 6 hours post consumption. Does not appear in withdrawal on admission  - CIWA score every 8 hours, sz precautions, aspiration precautions  - check vitamin B1, folic acid  - start MV, folic acid, thiamine daily while admitted  - mIVF  - no hx of liver disease, counseled on harmful effects of ETOH          Final Active Diagnoses:    Diagnosis Date Noted POA    PRINCIPAL PROBLEM:  Abscess of finger of left hand [L02.512] 10/02/2018 Yes    Other contact with alligator, initial encounter [W58.09XA] 10/04/2018 Unknown    ETOH abuse [F10.10] 10/02/2018 Yes    Tobacco abuse [Z72.0] 10/02/2018 Yes    Cellulitis [L03.90] 10/01/2018 Yes      Problems Resolved During this Admission:       Discharged Condition: good    Disposition: Home or Self Care    Follow Up:  Follow-up Information     Call Bakari Tirado MD.    Specialty:  Family Medicine  Contact information:  [address]  [address]  [facility] [id number]  [phone]             [facility] In 1 week.    Specialty:  Orthopedics  Why:   Follow up as scheduled by Orthopedics in 1 week.  Contact information:  [address]  [facility] [id number] [phone]               Patient Instructions:      Ambulatory Referral to Orthopedics   Referral Priority: Routine Referral Type: Consultation   Requested Specialty: Orthopedic Surgery   Number of Visits Requested: 1     Diet Adult Regular     Notify your health care provider if you experience any of the following:  temperature >100.4     Notify your health care provider if you experience any of the following:  severe uncontrolled pain     Notify your health care provider if you experience any of the following:  redness, tenderness, or signs of infection (pain, swelling, redness, odor or green/yellow discharge around incision site)     Notify your health care provider if you experience any of the following:  worsening rash     Activity as tolerated       Significant Diagnostic Studies: Microbiology: Wound Culture: positive for staph and morganella    Pending Diagnostic Studies:     None         Medications:  Reconciled Home Medications:      Medication List      START taking these medications    ciprofloxacin HCl 750 MG tablet  Commonly known as:  CIPRO  Take 1 tablet (750 mg total) by mouth every 12 (twelve) hours. for 14 days     doxycycline 100 MG tablet  Commonly known as:  VIBRA-TABS  Take 1 tablet (100 mg total) by mouth every 12 (twelve) hours. for 14 days     oxyCODONE 5 MG immediate release tablet  Commonly known as:  ROXICODONE  Take 1 tablet (5 mg total) by mouth every 6 (six) hours as needed.        CONTINUE taking these medications    cyclobenzaprine 10 MG tablet  Commonly known as:  FLEXERIL            Indwelling Lines/Drains at time of discharge:   Lines/Drains/Airways          None          Time spent on the discharge of patient: 36 minutes  Patient was seen and examined on the date of discharge and determined to be suitable for discharge.         Randolph Newton,  LIZBETH  Department of Hospital Medicine  [facility]

## 2018-10-04 NOTE — HOSPITAL COURSE
Patient admitted to observation for a L thumb abscess. I&D by orthopedics and wound culture grew staph aureus and morganella morganii-sensitive to cipro and doxy. Patient discharged with cipro and doxy; instructed to pull packing 10/04 and continue betadine soaks until orthopedic follow up in 1 week.

## 2018-10-04 NOTE — ASSESSMENT & PLAN NOTE
Cellulitis  Other contact with alligator, initial encounter  - evaluated by ortho and I&D performed   - cultures taken, hand wrapped   - keep LUE elevated during admission  -Wound cultures grew staph aureus and morganella morganii sensitive to cipro and doxy  - given Zosyn and vanc prior to tx.  C/w doxy and cipro on admission and discharge  - CRP and ESR WNL  - no leukocytosis, HDS, afebrile, LA 1.1  - PRN pain control  -Patient instructed to pull packing 10/04 and continue betadine soaks until ortho f/u in one week

## 2018-10-10 LAB — BACTERIA SPEC ANAEROBE CULT: NORMAL

## 2018-12-04 LAB
ACID FAST MOD KINY STN SPEC: NORMAL
MYCOBACTERIUM SPEC QL CULT: NORMAL

## 2019-07-11 ENCOUNTER — HOSPITAL ENCOUNTER (EMERGENCY)
Facility: HOSPITAL | Age: 44
Discharge: HOME | End: 2019-07-11
Attending: EMERGENCY MEDICINE
Payer: MEDICAID

## 2019-07-11 VITALS
WEIGHT: 150 LBS | OXYGEN SATURATION: 100 % | BODY MASS INDEX: 22.15 KG/M2 | TEMPERATURE: 98 F | RESPIRATION RATE: 19 BRPM | HEART RATE: 77 BPM | DIASTOLIC BLOOD PRESSURE: 72 MMHG | SYSTOLIC BLOOD PRESSURE: 126 MMHG
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
## 2019-07-11 LAB
ALBUMIN SERPL BCP-MCNC: 3.8 G/DL (ref 3.5–5.2)
ALP SERPL-CCNC: 104 U/L (ref 55–135)
ALT SERPL W/O P-5'-P-CCNC: 11 U/L (ref 10–44)
ANION GAP SERPL CALC-SCNC: 9 MMOL/L (ref 8–16)
APTT BLDCRRT: 30.7 SEC (ref 21–32)
AST SERPL-CCNC: 17 U/L (ref 10–40)
BASOPHILS # BLD AUTO: 0.02 K/UL (ref 0–0.2)
BASOPHILS NFR BLD: 0.4 % (ref 0–1.9)
BILIRUB SERPL-MCNC: 0.4 MG/DL (ref 0.1–1)
BUN SERPL-MCNC: 12 MG/DL (ref 6–20)
CALCIUM SERPL-MCNC: 9.1 MG/DL (ref 8.7–10.5)
CHLORIDE SERPL-SCNC: 108 MMOL/L (ref 95–110)
CO2 SERPL-SCNC: 23 MMOL/L (ref 23–29)
CREAT SERPL-MCNC: 0.8 MG/DL (ref 0.5–1.4)
D DIMER PPP IA.FEU-MCNC: <0.19 MG/L FEU
DIFFERENTIAL METHOD: ABNORMAL
EOSINOPHIL # BLD AUTO: 0.1 K/UL (ref 0–0.5)
EOSINOPHIL NFR BLD: 1.9 % (ref 0–8)
ERYTHROCYTE [DISTWIDTH] IN BLOOD BY AUTOMATED COUNT: 13.4 % (ref 11.5–14.5)
EST. GFR  (AFRICAN AMERICAN): >60 ML/MIN/1.73 M^2
EST. GFR  (NON AFRICAN AMERICAN): >60 ML/MIN/1.73 M^2
FIBRINOGEN PPP-MCNC: 240 MG/DL (ref 182–366)
GLUCOSE SERPL-MCNC: 89 MG/DL (ref 70–110)
HCT VFR BLD AUTO: 37.1 % (ref 40–54)
HGB BLD-MCNC: 12.4 G/DL (ref 14–18)
INR PPP: 0.9 (ref 0.8–1.2)
LYMPHOCYTES # BLD AUTO: 1.1 K/UL (ref 1–4.8)
LYMPHOCYTES NFR BLD: 23.1 % (ref 18–48)
MCH RBC QN AUTO: 31.4 PG (ref 27–31)
MCHC RBC AUTO-ENTMCNC: 33.4 G/DL (ref 32–36)
MCV RBC AUTO: 94 FL (ref 82–98)
MONOCYTES # BLD AUTO: 0.4 K/UL (ref 0.3–1)
MONOCYTES NFR BLD: 7.2 % (ref 4–15)
NEUTROPHILS # BLD AUTO: 3.3 K/UL (ref 1.8–7.7)
NEUTROPHILS NFR BLD: 67.4 % (ref 38–73)
PLATELET # BLD AUTO: 261 K/UL (ref 150–350)
PMV BLD AUTO: 8.7 FL (ref 9.2–12.9)
POTASSIUM SERPL-SCNC: 3.9 MMOL/L (ref 3.5–5.1)
PROT SERPL-MCNC: 6.9 G/DL (ref 6–8.4)
PROTHROMBIN TIME: 9.7 SEC (ref 9–12.5)
RBC # BLD AUTO: 3.95 M/UL (ref 4.6–6.2)
SODIUM SERPL-SCNC: 140 MMOL/L (ref 136–145)
WBC # BLD AUTO: 4.85 K/UL (ref 3.9–12.7)

## 2019-07-11 PROCEDURE — 85379 FIBRIN DEGRADATION QUANT: CPT

## 2019-07-11 PROCEDURE — 85730 THROMBOPLASTIN TIME PARTIAL: CPT

## 2019-07-11 PROCEDURE — 85025 COMPLETE CBC W/AUTO DIFF WBC: CPT

## 2019-07-11 PROCEDURE — 85384 FIBRINOGEN ACTIVITY: CPT

## 2019-07-11 PROCEDURE — 85610 PROTHROMBIN TIME: CPT

## 2019-07-11 PROCEDURE — 25000003 PHARM REV CODE 250: Performed by: EMERGENCY MEDICINE

## 2019-07-11 PROCEDURE — 93005 ELECTROCARDIOGRAM TRACING: CPT

## 2019-07-11 PROCEDURE — 96360 HYDRATION IV INFUSION INIT: CPT

## 2019-07-11 PROCEDURE — 99284 EMERGENCY DEPT VISIT MOD MDM: CPT | Mod: 25

## 2019-07-11 PROCEDURE — 80053 COMPREHEN METABOLIC PANEL: CPT

## 2019-07-11 RX ADMIN — SODIUM CHLORIDE 1000 ML: 0.9 INJECTION, SOLUTION INTRAVENOUS at 06:07

## 2019-07-11 NOTE — ED PROVIDER NOTES
Encounter Date: 7/11/2019    SCRIBE #1 NOTE: I, Kevin Oliver, am scribing for, and in the presence of,  Dr. Rowland. I have scribed the entire note.       History     Chief Complaint   Patient presents with    Snake Bite     reports was cutting tree and had snake bite the right side of neck. reports numbness to lips, jaw, and right side of neck. denies difficulty breathing. friend denies swelling to face at this time. pt able to tolerate own secretions at this time      Henok Willett is a 44 y.o. male who  has a past medical history of Depression, Hiatal hernia, Status post hip surgery, and Testicle lump.    The patient presents to the ED due to snake bite sustained this evening. Patient reports he ducked under a low-hanging branch of a tree while cutting some grass, when he felt a small brown snake fall from the tree onto the right side of his neck. He was bitten on the right side of his neck, and was slapped in the face by the snake's tail. The patient reports numbness to his lips, jaw, and right side of neck. He does not report any pain to the site of the bite, and has no other complaints at this time. His tetanus status is up to date.    The history is provided by the patient.     Review of patient's allergies indicates:   Allergen Reactions    Wasp sting [allergen ext-venom-honey bee] Swelling     Past Medical History:   Diagnosis Date    Depression     Hiatal hernia     Status post hip surgery     right    Testicle lump     surgery     Past Surgical History:   Procedure Laterality Date    hemorroidectomy      HIP SURGERY      PROCEDURE PROLAPSE HEMORRHOID (PPH) N/A 3/17/2017    [facility]    Right Testicle Surgery      SIGMOIDOSCOPY-FLEXIBLE N/A 3/17/2017    [facility]     Family History   Problem Relation Age of Onset    Alcohol abuse Father      Social History     Tobacco Use    Smoking status: Current Every Day Smoker     Packs/day:  1.00     Years: 30.00     Pack years: 30.00     Types: Cigarettes    Smokeless tobacco: Never Used   Substance Use Topics    Alcohol use: Yes     Alcohol/week: 0.0 oz     Comment: daily drinker (12-15 drinks a day)    Drug use: Yes     Types: Marijuana, Cocaine     Comment: every day marijuana, cocaine 2 times a week     Review of Systems   Skin:        Snake bite   Neurological: Positive for numbness.   All other systems reviewed and are negative.    Physical Exam     Initial Vitals [07/11/19 1715]   BP Pulse Resp Temp SpO2   124/69 79 20 98.5 °F (36.9 °C) 98 %      MAP       --         Physical Exam    Nursing note and vitals reviewed.  Constitutional: He appears well-developed and well-nourished. He is not diaphoretic. No distress.   Sitting comfortably in no apparant distress   HENT:   Head: Normocephalic and atraumatic.   Mouth/Throat: Oropharynx is clear and moist.   Eyes: Conjunctivae and EOM are normal.   Neck: Normal range of motion. Neck supple.   Cardiovascular: Normal rate, regular rhythm and normal heart sounds.   Pulmonary/Chest: Breath sounds normal. No respiratory distress.   Abdominal: Soft. There is no tenderness.   Musculoskeletal: Normal range of motion. He exhibits no edema or tenderness.   Neurological: He is alert and oriented to person, place, and time. He has normal strength.   Skin: Skin is warm and dry.   No swelling or fang marks       ED Course   Procedures  Labs Reviewed   CBC W/ AUTO DIFFERENTIAL - Abnormal; Notable for the following components:       Result Value    RBC 3.95 (*)     Hemoglobin 12.4 (*)     Hematocrit 37.1 (*)     Mean Corpuscular Hemoglobin 31.4 (*)     MPV 8.7 (*)     All other components within normal limits   APTT   PROTIME-INR   COMPREHENSIVE METABOLIC PANEL   D DIMER, QUANTITATIVE   FIBRINOGEN     EKG Readings: (Independently Interpreted)   Sinus bradycardia with sinus arrhythmia at 46 bpm  No STEMI                       Attending Attestation:            Physician Attestation for Scribe:  Physician Attestation Statement for Scribe #1: I, Dr. Rowland, reviewed documentation, as scribed by Kevin Oliver in my presence, and it is both accurate and complete.                 ED Course as of Jul 11 2000   Thu Jul 11, 2019 2000 Patient doing well; low clinical suspicion for envenomation. No neck swelling, no motor deficits; eating meal without difficulty. His lower facial paresthesias have improved. Will discharge to home.    [EW]      ED Course User Index  [EW] Kevin Oliver     Clinical Impression:       ICD-10-CM ICD-9-CM   1. Snake bite W59.11XA 989.5     E905.0       Disposition:   Disposition: Discharged  Condition: Stable       Nathaniel Rowland MD  07/11/19 2002

## 2019-07-11 NOTE — ED NOTES
Pt reports he was suddenly bit by a snake on the right side of his neck while cutting a tree. Has mild redness to the right side of his neck. No bleeding noted. Pt c/o numbness and subjective swelling to his lips, jaw, and to the right side of his neck. No obvious swelling noted. Respirations even, unlabored. Breath sounds clear and equal throughout chest.

## 2020-04-28 PROBLEM — S61.012A LACERATION OF LEFT THUMB WITHOUT FOREIGN BODY WITHOUT DAMAGE TO NAIL: Status: ACTIVE | Noted: 2020-04-28

## 2021-11-30 ENCOUNTER — HOSPITAL ENCOUNTER (EMERGENCY)
Facility: HOSPITAL | Age: 46
Discharge: HOME | End: 2021-11-30
Attending: FAMILY MEDICINE
Payer: MEDICAID

## 2021-11-30 VITALS
BODY MASS INDEX: 22.32 KG/M2 | WEIGHT: 160 LBS | HEART RATE: 99 BPM | SYSTOLIC BLOOD PRESSURE: 115 MMHG | TEMPERATURE: 99 F | RESPIRATION RATE: 18 BRPM | DIASTOLIC BLOOD PRESSURE: 77 MMHG

## 2021-11-30 DIAGNOSIS — M79.672 LEFT FOOT PAIN: ICD-10-CM

## 2021-11-30 DIAGNOSIS — S92.202A CLOSED FRACTURE OF TARSAL AND METATARSAL BONES OF LEFT FOOT, INITIAL ENCOUNTER: Primary | ICD-10-CM

## 2021-11-30 DIAGNOSIS — S92.302A CLOSED FRACTURE OF TARSAL AND METATARSAL BONES OF LEFT FOOT, INITIAL ENCOUNTER: Primary | ICD-10-CM

## 2021-11-30 PROCEDURE — 25000003 PHARM REV CODE 250: Performed by: NURSE PRACTITIONER

## 2021-11-30 PROCEDURE — 99284 EMERGENCY DEPT VISIT MOD MDM: CPT | Mod: 25

## 2021-11-30 RX ORDER — HYDROCODONE BITARTRATE AND ACETAMINOPHEN 5; 325 MG/1; MG/1
TABLET ORAL
Status: DISPENSED
Start: 2021-11-30

## 2021-11-30 RX ORDER — IBUPROFEN 800 MG/1
800 TABLET ORAL EVERY 6 HOURS PRN
Qty: 20 TABLET | Refills: 0 | Status: SHIPPED | OUTPATIENT
Start: 2021-11-30 | End: 2023-01-05 | Stop reason: HOSPADM

## 2021-11-30 RX ORDER — HYDROCODONE BITARTRATE AND ACETAMINOPHEN 5; 325 MG/1; MG/1
1 TABLET ORAL
Status: COMPLETED | OUTPATIENT
Start: 2021-11-30 | End: 2021-11-30

## 2021-11-30 RX ORDER — HYDROCODONE BITARTRATE AND ACETAMINOPHEN 5; 325 MG/1; MG/1
1 TABLET ORAL EVERY 8 HOURS PRN
Qty: 8 TABLET | Refills: 0 | Status: SHIPPED | OUTPATIENT
Start: 2021-11-30

## 2021-11-30 RX ADMIN — HYDROCODONE BITARTRATE AND ACETAMINOPHEN 1 TABLET: 5; 325 TABLET ORAL at 08:11

## 2023-01-05 ENCOUNTER — HOSPITAL ENCOUNTER (EMERGENCY)
Facility: HOSPITAL | Age: 48
Discharge: HOME | End: 2023-01-05
Attending: SURGERY
Payer: MEDICAID

## 2023-01-05 VITALS
SYSTOLIC BLOOD PRESSURE: 139 MMHG | HEART RATE: 96 BPM | DIASTOLIC BLOOD PRESSURE: 66 MMHG | HEIGHT: 71 IN | BODY MASS INDEX: 22.4 KG/M2 | WEIGHT: 160 LBS | OXYGEN SATURATION: 99 % | RESPIRATION RATE: 20 BRPM | TEMPERATURE: 97 F

## 2023-01-05 DIAGNOSIS — M25.572 LEFT ANKLE PAIN, UNSPECIFIED CHRONICITY: Primary | ICD-10-CM

## 2023-01-05 DIAGNOSIS — S99.912A LEFT ANKLE INJURY: ICD-10-CM

## 2023-01-05 DIAGNOSIS — R93.89 ABNORMAL X-RAY: ICD-10-CM

## 2023-01-05 DIAGNOSIS — M79.672 LEFT FOOT PAIN: ICD-10-CM

## 2023-01-05 PROCEDURE — 99284 EMERGENCY DEPT VISIT MOD MDM: CPT | Mod: 25

## 2023-01-05 PROCEDURE — 63600175 PHARM REV CODE 636 W HCPCS: Performed by: NURSE PRACTITIONER

## 2023-01-05 PROCEDURE — 96372 THER/PROPH/DIAG INJ SC/IM: CPT | Performed by: NURSE PRACTITIONER

## 2023-01-05 RX ORDER — KETOROLAC TROMETHAMINE 30 MG/ML
30 INJECTION, SOLUTION INTRAMUSCULAR; INTRAVENOUS
Status: COMPLETED | OUTPATIENT
Start: 2023-01-05 | End: 2023-01-05

## 2023-01-05 RX ORDER — KETOROLAC TROMETHAMINE 10 MG/1
10 TABLET, FILM COATED ORAL EVERY 6 HOURS PRN
Qty: 12 TABLET | Refills: 0 | Status: SHIPPED | OUTPATIENT
Start: 2023-01-05

## 2023-01-05 RX ADMIN — KETOROLAC TROMETHAMINE 30 MG: 30 INJECTION, SOLUTION INTRAMUSCULAR; INTRAVENOUS at 06:01

## 2023-01-05 NOTE — ED PROVIDER NOTES
Encounter Date: 1/5/2023       History     Chief Complaint   Patient presents with    Ankle Pain     Pt c/o ankle and foot pain, states he jumped up to help a neighbor and felt like something popped on his left foot.     Chief complaint:  Ankle pain    39-year-old male presents for evaluation of left foot and ankle pain.  Patient reports that he jumped up to help a neighbor and later began exhibiting pain to his left foot and ankle.  He is unsure if he injured it when he was moving quickly to help out.  No swelling.  Patient reports pain is worse with ambulation.  He does recall a remote injury to the ankle/foot 10 years ago.    This is the extent of patient's complaints for this ER encounter.     The history is provided by the patient.   Review of patient's allergies indicates:   Allergen Reactions    Wasp sting [allergen ext-venom-honey bee] Swelling     Past Medical History:   Diagnosis Date    Depression     Hiatal hernia     Status post hip surgery     right    Testicle lump     surgery     Past Surgical History:   Procedure Laterality Date    hemorroidectomy      HIP SURGERY      Right Testicle Surgery       Family History   Problem Relation Age of Onset    Alcohol abuse Father      Social History     Tobacco Use    Smoking status: Every Day     Packs/day: 1.00     Years: 30.00     Pack years: 30.00     Types: Cigarettes    Smokeless tobacco: Never   Substance Use Topics    Alcohol use: Yes     Alcohol/week: 0.0 standard drinks     Comment: daily drinker (12-15 drinks a day)    Drug use: Yes     Types: Marijuana, Cocaine     Comment: every day marijuana, cocaine 2 times a week     Review of Systems   Constitutional:  Negative for fever.   Musculoskeletal:  Positive for arthralgias and gait problem. Negative for back pain, joint swelling and neck pain.   Skin:  Negative for color change, rash and wound.   All other systems reviewed and are negative.    Physical Exam     Initial Vitals [01/05/23 1635]   BP Pulse  Resp Temp SpO2   139/66 96 20 97 °F (36.1 °C) 99 %      MAP       --         Physical Exam    Nursing note and vitals reviewed.  Constitutional: No distress.   HENT:   Head: Normocephalic and atraumatic.   Mouth/Throat: Mucous membranes are normal.   Eyes: Conjunctivae and lids are normal. Pupils are equal.   Cardiovascular:  Normal rate.           Pulmonary/Chest: Effort normal. No respiratory distress.   Musculoskeletal:      Left ankle: No swelling, deformity or ecchymosis. Tenderness present. No medial malleolus tenderness. Decreased range of motion.      Left foot: Decreased range of motion. Normal capillary refill. Tenderness present. No swelling, deformity or laceration. Normal pulse.     Neurological: He is alert and oriented to person, place, and time.   Skin: Skin is warm and dry.   Psychiatric: He has a normal mood and affect. His behavior is normal.       ED Course   Procedures  Labs Reviewed - No data to display       Imaging Results              X-Ray Ankle Complete Left (Final result)  Result time 01/05/23 17:41:24      Final result by Gabrielle Zapata MD (01/05/23 17:41:24)                   Impression:      Interval healing at the site of the fracture 5th metatarsal.  Findings suggesting small avulsion fracture likely old of the tip of the lateral malleolus.  No prior studies of the ankle for comparison      Electronically signed by: Gabrielle Zapata MD  Date:    01/05/2023  Time:    17:41               Narrative:    EXAMINATION:  XR ANKLE COMPLETE 3 VIEW LEFT; XR FOOT COMPLETE 3 VIEW LEFT    CLINICAL HISTORY:  Unspecified injury of left ankle, initial encounter; Pain in left foot    TECHNIQUE:  AP, lateral and oblique views of the left ankle were performed.    Three views of the left foot were obtained    COMPARISON:  11/30/2021    FINDINGS:  Interval healing of the fracture of the distal shaft of the 5th metatarsal with smooth callus formation.  There is slight irregularity of the cortex/contour  the plantar aspect of the distal shaft questioning the fractures completely healed but on other views and appears completely healed.  Recommend correlation clinically.  No new acute fractures seen at the foot    At the ankle there is faint ossific density distal to the lateral malleolus questioning avulsion fracture and although of indeterminate age appears fairly corticated and likely old.  Differential could include accessory ossification center.    No dislocation                                       X-Ray Foot Complete Left (Final result)  Result time 01/05/23 17:41:24      Final result by Gabrielle Zapata MD (01/05/23 17:41:24)                   Impression:      Interval healing at the site of the fracture 5th metatarsal.  Findings suggesting small avulsion fracture likely old of the tip of the lateral malleolus.  No prior studies of the ankle for comparison      Electronically signed by: Gabrielle Zapata MD  Date:    01/05/2023  Time:    17:41               Narrative:    EXAMINATION:  XR ANKLE COMPLETE 3 VIEW LEFT; XR FOOT COMPLETE 3 VIEW LEFT    CLINICAL HISTORY:  Unspecified injury of left ankle, initial encounter; Pain in left foot    TECHNIQUE:  AP, lateral and oblique views of the left ankle were performed.    Three views of the left foot were obtained    COMPARISON:  11/30/2021    FINDINGS:  Interval healing of the fracture of the distal shaft of the 5th metatarsal with smooth callus formation.  There is slight irregularity of the cortex/contour the plantar aspect of the distal shaft questioning the fractures completely healed but on other views and appears completely healed.  Recommend correlation clinically.  No new acute fractures seen at the foot    At the ankle there is faint ossific density distal to the lateral malleolus questioning avulsion fracture and although of indeterminate age appears fairly corticated and likely old.  Differential could include accessory ossification center.    No  "dislocation                                       Medications   ketorolac injection 30 mg (30 mg Intramuscular Given 1/5/23 4510)     Medical Decision Making:   Initial Assessment:   39-year-old male presents for evaluation of left foot and ankle pain.  Vague details of possible injury today.  Generalized tenderness to palpation to the ankle and foot.  No swelling.  No obvious deformity.  Clinical Tests:   Radiological Study: Ordered  ED Management:  Workup initiated including x-ray of the foot and ankle.  Toradol for pain.    Refer to patient course below for additional management.            ED Course as of 01/05/23 1817   Th Jan 05, 2023   4436 "Interval healing at the site of the fracture 5th metatarsal.  Findings suggesting small avulsion fracture likely old of the tip of the lateral malleolus.  No prior studies of the ankle for comparison."    Patient recalls that he did break his foot/ankle over 10 years ago.     Given possible new injury and patient's pain today, will plan for walking boot and NSAIDS with RICE.     Plan of care discussed with collaborating provider. Agrees with plan of care today.     Patient/caregiver voices understanding and feels comfortable with discharge plan.      The patient acknowledges that close follow up with medical provider is required. Instructed to follow up with PCP within 2 days. Patient was given specific return precautions. The patient agrees to comply with all instruction and directions given in the ER.    [EW]      ED Course User Index  [EW] Radha Carcamo NP                 Clinical Impression:   Final diagnoses:  [M79.672] Left foot pain  [S99.912A] Left ankle injury  [R93.89] Abnormal x-ray  [M25.572] Left ankle pain, unspecified chronicity (Primary)        ED Disposition Condition    Discharge Stable          ED Prescriptions       Medication Sig Dispense Start Date End Date Auth. Provider    ketorolac (TORADOL) 10 mg tablet Take 1 tablet (10 mg total) by mouth " every 6 (six) hours as needed for Pain. 12 tablet 1/5/2023 -- Radha Carcamo NP          Follow-up Information       Follow up With Specialties Details Why Contact Info Additional Information    Bakari Tirado MD Family Medicine Schedule an appointment as soon as possible for a visit in 2 days  [address]  [address] [facility] [address]  [phone]       [facility] - Orthoped[facility] Schedule an appointment as soon as possible for a visit in 2 days  [address] [facility], 5th Floor  [facility] [id number] [phone] [facility] - Main Building, 5th Floor Please park in [facility] and take [address] elevator             Radha Carcamo NP  01/05/23 3303

## 2023-01-06 NOTE — DISCHARGE INSTRUCTIONS
**Follow up with PCP or ortho in 24-48 hours. Return to ER with worsening of symptoms.     **You may also take over-the-counter Tylenol as directed on package insert as needed for pain.    Use crutches to avoid bearing weight on extremity if painful. Ice to affected area(s) 4x per day for 20 mins.  Elevate extremity as often as possible.

## 2023-02-14 NOTE — CONSULTS
[facility]  Infectious Disease  Consult Note    Patient Name: Henok Willett  MRN: [id number]  Admission Date: 10/1/2018  Hospital Length of Stay: 0 days  Attending Physician: No att. providers found  Primary Care Provider: Bakari Tirado MD     Isolation Status: No active isolations    Patient information was obtained from patient, past medical records and ER records.      Consults  Assessment/Plan:     * Abscess of finger of left hand       43 year old man with pmh significant for ETOH abuse and tobacco abuse who was transferred here from [facility] for orthopedic evaluation of a cut to his left dorsal thumb approximately 3 weeks ago which occurred when he was skinning an alligator.  It had appeared to be healing until about 3 days ago when he developed inceased pain, redness and swelling with purulent drainage. He went to [facility] and was given IV zosyn and vancomycin and transfrred here.     He underwent bedside I&D by Ortho. Per Ortho there is no concern for flexor tenosynovitis or web space abscess.  No concern for joint involvement    Blood cultures NGTD  Aerobic and anaerobic cultures pending.       Patient denies fevers, chills or other systemic symptoms.  Reports he did have his Tetanus shot a few days ago. Anxious to go home.     Plan/Recommendations:  1.  Ciprofloxacin 750 mg orally twice a day and doxycycline 100 mg orally twice a day X minimum of 14 days.   2.  Have added on AFB to existing cultures (risk of mycobacterium marinum)  3.  ID follow up prior to end of therapy    Patient seen by, and plan discussed with, ID staff  Discussed with Ortho and Primary Team                 Thank you for your consult. I will sign off. Please contact us if you have any additional questions.      Thank you.   Please call for any questions or concerns.  Monet Frey, MARYJANE, ANP-C  [phone]    Subjective:     Principal Problem: Abscess of finger of left hand    HPI:   Mr. Willett is a 43 year old man with pmh  significant for ETOH abuse and tobacco abuse who was transferred her from [facility] for orthopedic evaluation of a cut to his left dorsal thumb approximately 3 weeks ago which occurred when he was skinning an alligator.  It had appeared to be healing until about 3 days ago when he developed inceased pain, redness and swelling with purulent drainage. He went to [facility] and was given IV zosyn and vancomycin and transfrred here.     He underwent bedside I&D by Ortho. Per Ortho there is no concern for flexor tenosynovitis or web space abscess.     Blood cultures NGTD  Aerobic and anaerobic cultures pending.         Past Medical History:   Diagnosis Date    Depression     Hiatal hernia     Status post hip surgery     right    Testicle lump     surgery       Past Surgical History:   Procedure Laterality Date    hemorroidectomy      HIP SURGERY      PROCEDURE PROLAPSE HEMORRHOID (PPH) N/A 3/17/2017    [facility]    Right Testicle Surgery      SIGMOIDOSCOPY-FLEXIBLE N/A 3/17/2017    Performed by [facility]       Review of patient's allergies indicates:   Allergen Reactions    Wasp sting [allergen ext-venom-honey bee] Swelling       Medications:  No medications prior to admission.     Antibiotics (From admission, onward)    None        Antifungals (From admission, onward)    None        Antivirals (From admission, onward)    None             There is no immunization history on file for this patient.    Family History     Problem Relation (Age of Onset)    Alcohol abuse Father        Social History     Socioeconomic History    Marital status: Legally      Spouse name: None    Number of children: 1    Years of education: None    Highest education level: None   Social Needs    Financial resource strain: None    Food insecurity - worry: None    Food insecurity - inability: None    Transportation needs - medical: None    Transportation needs - non-medical:  None   Occupational History    None   Tobacco Use    Smoking status: Current Every Day Smoker     Packs/day: 1.00     Years: 30.00     Pack years: 30.00     Types: Cigarettes    Smokeless tobacco: Never Used   Substance and Sexual Activity    Alcohol use: Yes     Alcohol/week: 0.0 oz     Comment: daily drinker (12-15 drinks a day)    Drug use: Yes     Types: Marijuana, Cocaine     Comment: every day marijuana, cocaine 2 times a week    Sexual activity: Yes   Other Topics Concern    Patient feels they ought to cut down on drinking/drug use Not Asked    Patient annoyed by others criticizing their drinking/drug use Not Asked    Patient has felt bad or guilty about drinking/drug use Not Asked    Patient has had a drink/used drugs as an eye opener in the AM Not Asked   Social History Narrative    None     Review of Systems   Constitutional: Negative for chills, fever and unexpected weight change.   HENT: Negative for congestion and ear pain.    Respiratory: Negative for cough and shortness of breath.    Cardiovascular: Negative for chest pain and palpitations.   Gastrointestinal: Negative for abdominal pain, diarrhea, nausea and vomiting.   Genitourinary: Negative for difficulty urinating and dysuria.   Musculoskeletal: Positive for joint swelling. Negative for back pain and gait problem.        Pain left   thumb     Skin: Positive for wound (L dorsum thumb). Negative for rash.   Neurological: Negative for dizziness and light-headedness.   Psychiatric/Behavioral: Negative for agitation and confusion.     Objective:     Vital Signs (Most Recent):  Temp: 97 °F (36.1 °C) (10/02/18 1056)  Pulse: 82 (10/02/18 1056)  Resp: 18 (10/02/18 1056)  BP: 139/87 (10/02/18 1056)  SpO2: 99 % (10/02/18 1056) Vital Signs (24h Range):  Temp:  [96.4 °F (35.8 °C)-98.2 °F (36.8 °C)] 97 °F (36.1 °C)  Pulse:  [45-88] 82  Resp:  [16-18] 18  SpO2:  [97 %-100 %] 99 %  BP: (115-139)/(67-87) 139/87     Weight: 66.2 kg (145 lb 15.1  oz)  Body mass index is 21.55 kg/m².    Estimated Creatinine Clearance: 98.1 mL/min (based on SCr of 0.9 mg/dL).    Physical Exam   Constitutional: He is oriented to person, place, and time. He appears well-developed and well-nourished.   HENT:   Head: Normocephalic and atraumatic.   Eyes: Conjunctivae are normal. No scleral icterus.   Cardiovascular: Normal rate and regular rhythm.   Pulmonary/Chest: Effort normal. No respiratory distress.   Musculoskeletal:   Left hand in surgical dressing/sling. Dressing c/d/i   Neurological: He is alert and oriented to person, place, and time.   Skin: Skin is warm and dry.   Vitals reviewed.      Significant Labs:   Blood Culture:   Recent Labs   Lab  10/01/18   1714  10/01/18   1729   LABBLOO  No Growth to date  No Growth to date     CBC:   Recent Labs   Lab  10/01/18   1713  10/02/18   0505   WBC  6.92  6.96   HGB  16.4  14.7   HCT  47.5  45.8   PLT  308  256     CMP:   Recent Labs   Lab  10/01/18   1713  10/02/18   0505   NA  142  138   K  4.0  4.2   CL  104  100   CO2  26  27   GLU  44*  91   BUN  7  7   CREATININE  0.90  0.9   CALCIUM  9.8  9.4   PROT  9.2*  7.5   ALBUMIN  5.2  3.9   BILITOT  0.6  0.9   ALKPHOS  81  83   AST  32  21   ALT  18  12   ANIONGAP  12  11   EGFRNONAA  >60.0  >60.0     Wound Culture: No results for input(s): LABAERO in the last 4320 hours.    Significant Imaging: I have reviewed all pertinent imaging results/findings within the past 24 hours.               Mirvaso Counseling: Mirvaso is a topical medication which can decrease superficial blood flow where applied. Side effects are uncommon and include stinging, redness and allergic reactions.

## 2023-05-28 ENCOUNTER — NURSE TRIAGE (OUTPATIENT)
Dept: ADMINISTRATIVE | Facility: CLINIC | Age: 48
End: 2023-05-28
Payer: MEDICAID

## 2023-05-29 NOTE — TELEPHONE ENCOUNTER
"Patient states he has had a severe abdominal pain for 30 minutes that is getting better. The pain is on the right lower abdominal near his groin it pain is lessening. Patient is advised as per protocol.    Reason for Disposition   [1] MILD-MODERATE pain AND [2] constant and [3] present < 2 hours    Additional Information   Negative: Shock suspected (e.g., cold/pale/clammy skin, too weak to stand, low BP, rapid pulse)   Negative: Difficult to awaken or acting confused (e.g., disoriented, slurred speech)   Negative: Passed out (i.e., lost consciousness, collapsed and was not responding)   Negative: Sounds like a life-threatening emergency to the triager   Negative: Chest pain   Negative: Pain is mainly in upper abdomen  (if needed ask: "is it mainly above the belly button?")   Negative: Followed an abdomen (stomach) injury   Negative: Abdomen bloating or swelling are main symptoms   Negative: [1] SEVERE pain (e.g., excruciating) AND [2] present > 1 hour   Negative: [1] SEVERE pain AND [2] age > 60 years   Negative: [1] Vomiting AND [2] contains red blood or black ("coffee ground") material  (Exception: Few red streaks in vomit that only happened once.)   Negative: Blood in bowel movements  (Exception: Blood on surface of BM with constipation.)   Negative: Black or tarry bowel movements  (Exception: Chronic-unchanged black-grey BMs AND is taking iron pills or Pepto-Bismol.)   Negative: [1] Unable to urinate (or only a few drops) > 4 hours AND [2] bladder feels very full (e.g., palpable bladder or strong urge to urinate)   Negative: [1] Vomiting AND [2] contains bile (green color)   Negative: [1] Pain in the scrotum or testicle AND [2] present > 1 hour   Negative: Patient sounds very sick or weak to the triager   Negative: [1] MILD-MODERATE pain AND [2] constant AND [3] present > 2 hours   Negative: [1] Vomiting AND [2] abdomen looks much more swollen than usual   Negative: White of the eyes have turned yellow (i.e., " jaundice)   Negative: Fever > 103 F (39.4 C)   Negative: [1] Fever > 101 F (38.3 C) AND [2] age > 60 years   Negative: [1] Fever > 100.0 F (37.8 C) AND [2] bedridden (e.g., CVA, chronic illness, recovering from surgery)   Negative: [1] Fever > 100.0 F (37.8 C) AND [2] diabetes mellitus or weak immune system (e.g., HIV positive, cancer chemo, splenectomy, organ transplant, chronic steroids)   Negative: [1] SEVERE pain AND [2] present < 1 hour   Negative: [1] MODERATE pain (e.g., interferes with normal activities) AND [2] pain comes and goes (cramps) AND [3] present > 24 hours  (Exception: Pain with Vomiting or Diarrhea - see that Guideline.)   Negative: [1] MILD pain (e.g., does not interfere with normal activities) AND [2] pain comes and goes (cramps) [3] present > 48 hours  (Exception: This same abdominal pain is a chronic symptom recurrent or ongoing AND present > 4 weeks.)   Negative: Age > 60 years   Negative: Blood in urine (red, pink, or tea-colored)   Negative: Abdominal pain is a chronic symptom (recurrent or ongoing AND present > 4 weeks)    Protocols used: Abdominal Pain - Male-A-

## 2025-06-11 PROBLEM — R45.851 SUICIDAL IDEATION: Status: ACTIVE | Noted: 2025-06-11

## 2025-06-12 PROBLEM — F14.10 COCAINE ABUSE: Status: ACTIVE | Noted: 2025-06-12

## 2025-06-12 PROBLEM — F15.10 METHAMPHETAMINE ABUSE: Status: ACTIVE | Noted: 2025-06-12

## 2025-06-12 PROBLEM — F19.94 SUBSTANCE INDUCED MOOD DISORDER: Status: ACTIVE | Noted: 2025-06-12

## 2025-06-12 PROBLEM — R45.851 SUICIDAL IDEATION: Status: RESOLVED | Noted: 2025-06-11 | Resolved: 2025-06-12

## 2025-08-26 ENCOUNTER — HOSPITAL ENCOUNTER (EMERGENCY)
Facility: HOSPITAL | Age: 50
Discharge: HOME | End: 2025-08-26
Attending: EMERGENCY MEDICINE
Payer: COMMERCIAL

## 2025-08-29 ENCOUNTER — PATIENT OUTREACH (OUTPATIENT)
Facility: OTHER | Age: 50
End: 2025-08-29
Payer: MEDICAID